# Patient Record
Sex: MALE | Race: OTHER | HISPANIC OR LATINO | Employment: FULL TIME | URBAN - METROPOLITAN AREA
[De-identification: names, ages, dates, MRNs, and addresses within clinical notes are randomized per-mention and may not be internally consistent; named-entity substitution may affect disease eponyms.]

---

## 2017-06-28 ENCOUNTER — OFFICE VISIT (OUTPATIENT)
Dept: FAMILY MEDICINE | Facility: CLINIC | Age: 22
End: 2017-06-28
Payer: COMMERCIAL

## 2017-06-28 VITALS
WEIGHT: 178.13 LBS | DIASTOLIC BLOOD PRESSURE: 71 MMHG | OXYGEN SATURATION: 97 % | HEIGHT: 72 IN | HEART RATE: 77 BPM | SYSTOLIC BLOOD PRESSURE: 127 MMHG | BODY MASS INDEX: 24.13 KG/M2

## 2017-06-28 DIAGNOSIS — Z87.09 HISTORY OF PNEUMOTHORAX: ICD-10-CM

## 2017-06-28 DIAGNOSIS — Z11.3 SCREENING EXAMINATION FOR STD (SEXUALLY TRANSMITTED DISEASE): ICD-10-CM

## 2017-06-28 DIAGNOSIS — Z72.0 TOBACCO ABUSE: ICD-10-CM

## 2017-06-28 DIAGNOSIS — Z00.00 ROUTINE GENERAL MEDICAL EXAMINATION AT A HEALTH CARE FACILITY: Primary | ICD-10-CM

## 2017-06-28 DIAGNOSIS — Z23 NEED FOR DIPHTHERIA-TETANUS-PERTUSSIS (TDAP) VACCINE: ICD-10-CM

## 2017-06-28 DIAGNOSIS — G47.00 INSOMNIA, UNSPECIFIED TYPE: ICD-10-CM

## 2017-06-28 DIAGNOSIS — J45.990 EXERCISE-INDUCED ASTHMA: ICD-10-CM

## 2017-06-28 DIAGNOSIS — N50.819 TESTICULAR PAIN, UNSPECIFIED: ICD-10-CM

## 2017-06-28 PROBLEM — J93.9 PNEUMOTHORAX, LEFT: Status: ACTIVE | Noted: 2017-06-28

## 2017-06-28 PROCEDURE — 99395 PREV VISIT EST AGE 18-39: CPT | Mod: 25,S$GLB,, | Performed by: FAMILY MEDICINE

## 2017-06-28 PROCEDURE — 90471 IMMUNIZATION ADMIN: CPT | Mod: S$GLB,,, | Performed by: FAMILY MEDICINE

## 2017-06-28 PROCEDURE — 99999 PR PBB SHADOW E&M-EST. PATIENT-LVL IV: CPT | Mod: PBBFAC,,, | Performed by: FAMILY MEDICINE

## 2017-06-28 PROCEDURE — 90715 TDAP VACCINE 7 YRS/> IM: CPT | Mod: S$GLB,,, | Performed by: FAMILY MEDICINE

## 2017-06-28 RX ORDER — ALBUTEROL SULFATE 90 UG/1
AEROSOL, METERED RESPIRATORY (INHALATION)
Qty: 1 EACH | Refills: 5 | Status: SHIPPED | OUTPATIENT
Start: 2017-06-28 | End: 2018-11-19 | Stop reason: SDUPTHER

## 2017-06-28 NOTE — PROGRESS NOTES
Office Visit    Patient Name: Salvatore Bobby    : 1995  MRN: [id number]    Subjective:  Salvatore GUO is a 22 y.o. male who presents today for:    Establish Care    Salvatore Bobby presents today for annual wellness exam.     They have been feeling overall well.  He has a history of exercise-induced asthma that has overall been very well controlled, only very rarely uses albuterol prior to exercise.  He does note some intermittent diarrhea and suspects that it is related to some food hypersensitivities, as he notices an association with certain milk-based products and certain spices.  He does not have blood in his stool, and he has normal bowel movements in between her episodes of diarrhea.  Finally, he does complain of some intermittently painful testicular elevation.  He notices that during sexual intercourse and during activities such as running his testicles seem to ride up in the inguinal canal and this is quite painful.  He does not have a history of hernia, hydrocele, or other testicular abnormality.  He has not noticed any abnormalities on self exam.  Her pain with activities such as coughing or having lifting.  His urinary habits are normal.    General lifestyle habits are as follows:  Diet is described as generally healthy-- no regular fast food or soft drinks, exercise is described as good-- plays volley ball and going to the gym, sleep is described as poor-- stays up very late-- gets about 6-7 hours nightly.   Rare alcohol on special occasions.  Quit smoking a few years ago. Weight is up and down some but overall stable in the last 2 years.    Immunizations: TDaP 2006-- due for booster and given, otherwise pediatric immunizations up to date    Screening Tests: STD screening and blood work today    Eye/Dental: has eye appointment pending and dental up to date            Past Medical History  Past Medical History:   Diagnosis Date    Chest pain, unspecified     Exercise-induced asthma      Pneumothorax, left     treated with chest tube 2012       Past Surgical History  Past Surgical History:   Procedure Laterality Date    pneumothorax         Family History  Family History   Problem Relation Age of Onset    Hyperlipidemia Father     Heart disease Paternal Grandmother     Hyperlipidemia Mother     Heart disease Maternal Grandmother     Hyperlipidemia Maternal Grandmother     Hypertension Maternal Grandmother     Diabetes Maternal Grandmother     Diabetes Paternal Grandfather        Social History  Social History     Social History    Marital status: Single     Spouse name: N/A    Number of children: N/A    Years of education: N/A     Occupational History     Student     Social History Main Topics    Smoking status: Former Smoker     Types: Cigarettes    Smokeless tobacco: Never Used      Comment: quit in 2012/2013 after pneumothorax    Alcohol use Yes      Comment: occasionally    Drug use: Unknown    Sexual activity: Yes     Partners: Female     Other Topics Concern    Not on file     Social History Narrative    Garcia--seniorPlays soccerDad smokes intermittentlyNo pets       Current Medications  Medications reviewed and updated.     Allergies   Review of patient's allergies indicates:  No Known Allergies    Review of Systems (Pertinent positives)  Review of Systems   Constitutional: Negative for unexpected weight change.   HENT: Positive for hearing loss (has some concern for decreased hearing).    Eyes: Negative for visual disturbance.   Respiratory: Negative for shortness of breath and wheezing.    Cardiovascular: Negative for chest pain.   Gastrointestinal: Positive for diarrhea. Negative for blood in stool.   Genitourinary: Positive for testicular pain. Negative for difficulty urinating, discharge and scrotal swelling.   Allergic/Immunologic: Food allergies: possible food sensitivities.   Neurological: Negative for dizziness.   Psychiatric/Behavioral: Positive for sleep  disturbance (mild).       /71   Pulse 77   Ht 6' (1.829 m)   Wt 80.8 kg (178 lb 2.1 oz)   SpO2 97%   BMI 24.16 kg/m²     Physical Exam      Assessment/Plan:  Salvatore Bobby is a 22 y.o. male who presents today for :    Salvatore GUO was seen today for establish care.    Diagnoses and all orders for this visit:    Routine general medical examination at a health care facility  -     Comprehensive metabolic panel; Future  -     Lipid panel; Future  -     CBC auto differential; Future  -     TSH; Future  -     Vitamin D; Future  -     HIV 1 / 2 ANTIBODY; Future  -     C. trachomatis/N. gonorrhoeae by AMP DNA Urine    Body mass index (BMI) 24.0-24.9, adult    Exercise-induced asthma  -     albuterol 90 mcg/actuation inhaler; Inhale 1-2 puffs 20-30 minutes before exercise as needed.    History of pneumothorax    Tobacco abuse  Comments:  quit in 2012/2013, no cigarettes in the last 2-3 years    Screening examination for STD (sexually transmitted disease)  -     HIV 1 / 2 ANTIBODY; Future  -     C. trachomatis/N. gonorrhoeae by AMP DNA Urine    Insomnia, unspecified type  Comments:  trial of regular exercise and regular sleep schedule, benadryl as needed    Testicular pain, unspecified  Comments:  some testicular elevation that is at times painful-?hypersensitive cremasteric reflex vs temperature regulation issues. unremarkable exam- will refer to Urology  Orders:  -     Ambulatory referral to Urology    Need for diphtheria-tetanus-pertussis (Tdap) vaccine  -     (In Office Administered) Tdap Vaccine            ICD-10-CM ICD-9-CM    1. Routine general medical examination at a health care facility Z00.00 V70.0 Comprehensive metabolic panel      Lipid panel      CBC auto differential      TSH      Vitamin D      HIV 1 / 2 ANTIBODY      C. trachomatis/N. gonorrhoeae by AMP DNA Urine   2. Body mass index (BMI) 24.0-24.9, adult Z68.24 V85.1    3. Exercise-induced asthma J45.990 493.81 albuterol 90 mcg/actuation inhaler    4. History of pneumothorax Z87.09 V12.69    5. Tobacco abuse Z72.0 305.1     quit in 2012/2013, no cigarettes in the last 2-3 years   6. Screening examination for STD (sexually transmitted disease) Z11.3 V74.5 HIV 1 / 2 ANTIBODY      C. trachomatis/N. gonorrhoeae by AMP DNA Urine   7. Insomnia, unspecified type G47.00 780.52     trial of regular exercise and regular sleep schedule, benadryl as needed   8. Testicular pain, unspecified N50.819 608.9 Ambulatory referral to Urology    some testicular elevation that is at times painful-?hypersensitive cremasteric reflex vs temperature regulation issues. unremarkable exam- will refer to Urology   9. Need for diphtheria-tetanus-pertussis (Tdap) vaccine Z23 V06.1 (In Office Administered) Tdap Vaccine       Return in about 1 year (around 6/28/2018) for return as needed for new concerns.

## 2017-07-14 ENCOUNTER — LAB VISIT (OUTPATIENT)
Dept: LAB | Facility: HOSPITAL | Age: 22
End: 2017-07-14
Attending: FAMILY MEDICINE
Payer: COMMERCIAL

## 2017-07-14 ENCOUNTER — OFFICE VISIT (OUTPATIENT)
Dept: UROLOGY | Facility: CLINIC | Age: 22
End: 2017-07-14
Payer: COMMERCIAL

## 2017-07-14 VITALS
WEIGHT: 178 LBS | HEART RATE: 67 BPM | SYSTOLIC BLOOD PRESSURE: 113 MMHG | BODY MASS INDEX: 24.11 KG/M2 | HEIGHT: 72 IN | DIASTOLIC BLOOD PRESSURE: 73 MMHG

## 2017-07-14 DIAGNOSIS — Z00.00 ROUTINE GENERAL MEDICAL EXAMINATION AT A HEALTH CARE FACILITY: ICD-10-CM

## 2017-07-14 DIAGNOSIS — N50.819 TESTICULAR PAIN, UNSPECIFIED: Primary | ICD-10-CM

## 2017-07-14 DIAGNOSIS — Z11.3 SCREENING EXAMINATION FOR STD (SEXUALLY TRANSMITTED DISEASE): ICD-10-CM

## 2017-07-14 LAB
25(OH)D3+25(OH)D2 SERPL-MCNC: 25 NG/ML
ALBUMIN SERPL BCP-MCNC: 4 G/DL
ALP SERPL-CCNC: 85 U/L
ALT SERPL W/O P-5'-P-CCNC: 13 U/L
ANION GAP SERPL CALC-SCNC: 9 MMOL/L
AST SERPL-CCNC: 32 U/L
BASOPHILS # BLD AUTO: 0.03 K/UL
BASOPHILS NFR BLD: 0.5 %
BILIRUB SERPL-MCNC: 1.8 MG/DL
BUN SERPL-MCNC: 17 MG/DL
CALCIUM SERPL-MCNC: 9 MG/DL
CHLORIDE SERPL-SCNC: 104 MMOL/L
CHOLEST/HDLC SERPL: 3.8 {RATIO}
CO2 SERPL-SCNC: 27 MMOL/L
CREAT SERPL-MCNC: 0.9 MG/DL
DIFFERENTIAL METHOD: ABNORMAL
EOSINOPHIL # BLD AUTO: 0.1 K/UL
EOSINOPHIL NFR BLD: 1.9 %
ERYTHROCYTE [DISTWIDTH] IN BLOOD BY AUTOMATED COUNT: 12.6 %
EST. GFR  (AFRICAN AMERICAN): >60 ML/MIN/1.73 M^2
EST. GFR  (NON AFRICAN AMERICAN): >60 ML/MIN/1.73 M^2
GLUCOSE SERPL-MCNC: 92 MG/DL
HCT VFR BLD AUTO: 44.8 %
HDL/CHOLESTEROL RATIO: 26.1 %
HDLC SERPL-MCNC: 157 MG/DL
HDLC SERPL-MCNC: 41 MG/DL
HGB BLD-MCNC: 14.6 G/DL
HIV 1+2 AB+HIV1 P24 AG SERPL QL IA: NEGATIVE
LDLC SERPL CALC-MCNC: 99 MG/DL
LYMPHOCYTES # BLD AUTO: 3.1 K/UL
LYMPHOCYTES NFR BLD: 48.5 %
MCH RBC QN AUTO: 28 PG
MCHC RBC AUTO-ENTMCNC: 32.6 %
MCV RBC AUTO: 86 FL
MONOCYTES # BLD AUTO: 0.4 K/UL
MONOCYTES NFR BLD: 7 %
NEUTROPHILS # BLD AUTO: 2.7 K/UL
NEUTROPHILS NFR BLD: 42.1 %
NONHDLC SERPL-MCNC: 116 MG/DL
PLATELET # BLD AUTO: 221 K/UL
PMV BLD AUTO: 9.9 FL
POTASSIUM SERPL-SCNC: 3.6 MMOL/L
PROT SERPL-MCNC: 7.1 G/DL
RBC # BLD AUTO: 5.21 M/UL
SODIUM SERPL-SCNC: 140 MMOL/L
TRIGL SERPL-MCNC: 85 MG/DL
TSH SERPL DL<=0.005 MIU/L-ACNC: 1.91 UIU/ML
WBC # BLD AUTO: 6.33 K/UL

## 2017-07-14 PROCEDURE — 80061 LIPID PANEL: CPT

## 2017-07-14 PROCEDURE — 82306 VITAMIN D 25 HYDROXY: CPT

## 2017-07-14 PROCEDURE — 99999 PR PBB SHADOW E&M-EST. PATIENT-LVL III: CPT | Mod: PBBFAC,,, | Performed by: UROLOGY

## 2017-07-14 PROCEDURE — 80053 COMPREHEN METABOLIC PANEL: CPT

## 2017-07-14 PROCEDURE — 85025 COMPLETE CBC W/AUTO DIFF WBC: CPT

## 2017-07-14 PROCEDURE — 99203 OFFICE O/P NEW LOW 30 MIN: CPT | Mod: S$GLB,,, | Performed by: UROLOGY

## 2017-07-14 PROCEDURE — 84443 ASSAY THYROID STIM HORMONE: CPT

## 2017-07-14 PROCEDURE — 36415 COLL VENOUS BLD VENIPUNCTURE: CPT

## 2017-07-14 PROCEDURE — 86703 HIV-1/HIV-2 1 RESULT ANTBDY: CPT

## 2017-07-14 NOTE — LETTER
July 14, 2017      Macarena Alberto MD  [address]  [address]  [facility] [address]           [facility]  [address]  [facility] [address]  Phone: [phone]          Patient: Salvatore Bobby   MR Number: [id number]   YOB: 1995   Date of Visit: 7/14/2017       Dear Dr. Macarena Alberto:    Thank you for referring Salvatore Bobby to me for evaluation. Attached you will find relevant portions of my assessment and plan of care.    If you have questions, please do not hesitate to call me. I look forward to following Salvatore Bobby along with you.    Sincerely,    Neftali Brown MD    Enclosure  CC:  No Recipients    If you would like to receive this communication electronically, please contact [email or web address] or [phone] to request more information on [facility] Link access.    For providers and/or their staff who would like to refer a patient to [facility], please contact us through our one-stop-shop provider referral line, [facility], at [phone].    If you feel you have received this communication in error or would no longer like to receive these types of communications, please e-mail [email or web address]

## 2017-07-14 NOTE — PROGRESS NOTES
"HPI:  Salvatore Bobby is a 22 y.o. year old male that  presents with   Chief Complaint   Patient presents with    Testicle Pain     pt states discomfort when active   .  This patient referred by his PCP for testicular pain.  He states this is been going on for 6 years.  He states this is bilateral.  He states that it is worse when he is active.  He states that both testicles recede back into my body".    He gives no history of injury infection or surgery or trauma to his testicles.  He has no dysuria    Is no family history of prostate cancer and the patient is never had any urological surgery    Chart review shows no from his PCP from last month showing asthma and testicular pain prompting urology referral.  In 2014 GFR was greater than 60      Past Medical History:   Diagnosis Date    Chest pain, unspecified     Exercise-induced asthma     Pneumothorax, left     treated with chest tube 2012     Social History     Social History    Marital status: Single     Spouse name: N/A    Number of children: N/A    Years of education: N/A     Occupational History     Student     Social History Main Topics    Smoking status: Former Smoker     Types: Cigarettes    Smokeless tobacco: Never Used      Comment: quit in 2012/2013 after pneumothorax    Alcohol use Yes      Comment: occasionally    Drug use: Unknown    Sexual activity: Yes     Partners: Female     Other Topics Concern    Not on file     Social History Narrative    Garcia--seniorPlays soccerDad smokes intermittentlyNo pets     Past Surgical History:   Procedure Laterality Date    pneumothorax       Family History   Problem Relation Age of Onset    Hyperlipidemia Father     Heart disease Paternal Grandmother     Hyperlipidemia Mother     Heart disease Maternal Grandmother     Hyperlipidemia Maternal Grandmother     Hypertension Maternal Grandmother     Diabetes Maternal Grandmother     Diabetes Paternal Grandfather            Review of " Systems  The patient has no chest pains.  The patient has no shortness of breath  Patient wears        glasses.  All other review of systems are negative.      Physical Exam:  /73   Pulse 67   Ht 6' (1.829 m)   Wt 80.7 kg (178 lb)   BMI 24.14 kg/m²   General appearance: alert, cooperative, no distress  Constitutional:Oriented to person, place, and time.appears well-developed and well-nourished.   HEENT: Normocephalic, atraumatic, neck symmetrical, no nasal discharge   Eyes: conjunctivae/corneas clear, PERRL, EOM's intact  Lungs: clear to auscultation bilaterally, no dullness to percussion bilaterally  Heart: regular rate and rhythm without rub; no displacement of the PMI   Abdomen: soft, non-tender; bowel sounds normoactive; no organomegaly  :Penis/perineum without lesions, scrotum without rash/cysts, epididymis nontender bilaterally, urethral meatus in normal location normal size, no penile plaques palpated testes equal in size without masses.  Extremities: extremities symmetric; no clubbing, cyanosis, or edema  Integument: Skin color, texture, turgor normal; no rashes; hair distrubution normal  Neurologic: Alert and oriented X 3, normal strength, normal coordination and gait  Psychiatric: no pressured speech; normal affect; no evidence of impaired cognition     LABS:    Complete Blood Count  Lab Results   Component Value Date    RBC 5.21 07/14/2017    HGB 14.6 07/14/2017    HCT 44.8 07/14/2017    MCV 86 07/14/2017    MCH 28.0 07/14/2017    MCHC 32.6 07/14/2017    RDW 12.6 07/14/2017     07/14/2017    MPV 9.9 07/14/2017    GRAN 2.7 07/14/2017    GRAN 42.1 07/14/2017    LYMPH 3.1 07/14/2017    LYMPH 48.5 (H) 07/14/2017    MONO 0.4 07/14/2017    MONO 7.0 07/14/2017    EOS 0.1 07/14/2017    BASO 0.03 07/14/2017    EOSINOPHIL 1.9 07/14/2017    BASOPHIL 0.5 07/14/2017    DIFFMETHOD Automated 07/14/2017       Comprehensive Metabolic Panel  Lab Results   Component Value Date    GLU 92 07/14/2017    BUN  17 07/14/2017    CREATININE 0.9 07/14/2017     07/14/2017    K 3.6 07/14/2017     07/14/2017    PROT 7.1 07/14/2017    ALBUMIN 4.0 07/14/2017    BILITOT 1.8 (H) 07/14/2017    AST 32 07/14/2017    ALKPHOS 85 07/14/2017    CO2 27 07/14/2017    ALT 13 07/14/2017    ANIONGAP 9 07/14/2017    EGFRNONAA >60 07/14/2017    ESTGFRAFRICA >60 07/14/2017       PSA  No results found for: PSA      Assessment:    ICD-10-CM ICD-9-CM    1. Testicular pain, unspecified N50.819 608.9      The encounter diagnosis was Testicular pain, unspecified.      Plan: #1.  Testicular pain.  Plan.  I discussed that I cannot explain the cause of his pain.  This seems to be related to a very active cremasteric reflex for which there is no specific therapy other than bilateral orchiopexy which I do not recommend, since at rest his testicles are in the normal position.  I reassured the patient that his exam is normal.  At this point follow-up on an as-needed basis  No orders of the defined types were placed in this encounter.          Neftali Brown MD    PLEASE NOTE:  Please be advised that portions of this note were dictated using voice recognition software and may contain dictation related errors in spelling/grammar/appropriate pronouns/syntax or other errors that might have not been found and or corrected on text review.

## 2017-07-15 PROBLEM — E55.9 VITAMIN D DEFICIENCY: Status: ACTIVE | Noted: 2017-07-15

## 2017-07-15 PROBLEM — Z72.0 TOBACCO ABUSE: Status: RESOLVED | Noted: 2017-06-28 | Resolved: 2017-07-15

## 2017-07-17 ENCOUNTER — TELEPHONE (OUTPATIENT)
Dept: FAMILY MEDICINE | Facility: CLINIC | Age: 22
End: 2017-07-17

## 2018-02-23 ENCOUNTER — OFFICE VISIT (OUTPATIENT)
Dept: FAMILY MEDICINE | Facility: CLINIC | Age: 23
End: 2018-02-23
Payer: COMMERCIAL

## 2018-02-23 VITALS
SYSTOLIC BLOOD PRESSURE: 113 MMHG | TEMPERATURE: 98 F | HEIGHT: 72 IN | WEIGHT: 189.63 LBS | BODY MASS INDEX: 25.68 KG/M2 | OXYGEN SATURATION: 99 % | HEART RATE: 60 BPM | DIASTOLIC BLOOD PRESSURE: 66 MMHG

## 2018-02-23 DIAGNOSIS — S06.0X0A CONCUSSION WITHOUT LOSS OF CONSCIOUSNESS, INITIAL ENCOUNTER: Primary | ICD-10-CM

## 2018-02-23 DIAGNOSIS — M25.571 ACUTE RIGHT ANKLE PAIN: ICD-10-CM

## 2018-02-23 PROCEDURE — 3008F BODY MASS INDEX DOCD: CPT | Mod: S$GLB,,, | Performed by: FAMILY MEDICINE

## 2018-02-23 PROCEDURE — 99214 OFFICE O/P EST MOD 30 MIN: CPT | Mod: S$GLB,,, | Performed by: FAMILY MEDICINE

## 2018-02-23 PROCEDURE — 99999 PR PBB SHADOW E&M-EST. PATIENT-LVL III: CPT | Mod: PBBFAC,,, | Performed by: FAMILY MEDICINE
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
## 2018-02-23 NOTE — PATIENT INSTRUCTIONS
"  Concussion    A concussion can be caused by a direct blow to the head, neck, face, or somewhere else on the body with the force being transmitted to the head. This may cause you to lose consciousness - be "knocked out" - but not always. Depending on the severity of the blow, it will take from a few hours up to a few days to get better. Sometimes symptoms may last a few months or longer. This is called post-concussion syndrome.  At first, you may have a headache, nausea, vomiting, or dizziness. You may also have problems concentrating or remembering things. This is normal.  Symptoms should get better as the hours and days go by. Symptoms that get worse could be a sign of a more serious injury. This might be a bruise or bleeding in the brain. Thats why its important to watch for the warning signs listed below.  Home care  If your injury is mild and there are no serious signs or symptoms, your healthcare provider may recommend that you be monitored at home. If there is evidence that the injury is more serious, you will be monitored in the hospital. Follow these tips to help care for yourself at home:  · After a concussion, your healthcare provider may recommend that a family member or friend monitor you for 12 to 24 hours. They may be told to wake you every few hours during sleep to check for the signs below.  · If your face or scalp swells, apply an ice pack for 20 minutes every 1 to 2 hours. Do this until the swelling starts to go down. You can make an ice pack by putting ice cubes in a plastic bag and wrapping the bag in a towel.  · You may use acetaminophen to control pain, unless another pain medicine was prescribed. Do not use aspirin or ibuprofen after a head injury. If you have chronic liver or kidney disease, talk with your doctor before using these medicines. Also talk with your doctor if you ever had a stomach ulcer or gastrointestinal bleeding.  · For the next 24 hours:  ¨ Dont drink alcohol or take " sedatives or medicines that make you sleepy.  ¨ Dont drive or operate machinery.  ¨ Avoid doing anything strenuous. Dont lift or strain.  · Dont return to sports or any activity that could cause you to hit your head until all symptoms are gone and you have been cleared by your doctor. A second head injury before fully recovering from the first one can lead to serious brain injury.  · Avoid doing activities that require a lot of concentration or a lot of attention. This will allow your brain to rest and heal quicker.  Follow-up care  Follow up with your doctor in 1 week, or as directed.  Note: A radiologist will review any X-rays or CT scans that were taken. You will be told of any new findings that may affect your care.  When to seek medical advice  Call your healthcare provider right away if any of these occur:  · Repeated vomiting  · Headache or dizziness that is severe or gets worse  · Loss of consciousness  · Unusual drowsiness, or unable to wake up as usual  · Weakness or decreased ability to walk or move any limb  · Confusion, agitation, or change in behavior or speech, or memory loss  · Blurred vision  · Convulsion (seizure)  · Swelling on the scalp or face that gets worse  · Changes in pupil size (the black part of the eye)  · Redness, warmth, or pus from the swollen area  · Fluid draining from or bleeding from the nose or ears     Date Last Reviewed: 8/14/2015  © 2914-1953 [employer]. [address] [place] [address]. All rights reserved. This information is not intended as a substitute for professional medical care. Always follow your healthcare professional's instructions.        Foot Contusion  You have a contusion. This is also called a bruise. There is swelling and some bleeding under the skin, but no broken bones. This injury generally takes a few days to a few weeks to heal.  During that time, the bruise will typically change in color from reddish, to purple-blue, to  greenish-yellow, then to yellow-brown.  Home care  · Elevate the foot to reduce pain and swelling. As much as possible, sit or lie down with the foot raised about the level of your heart. This is especially important during the first 48 hours.  · Ice the foot to help reduce pain and swelling. Wrap a cold source (ice pack or ice cubes in a plastic bag) in a thin towel. Apply to the bruised area for 20 minutes every 1 to 2 hours the first day. Continue this 3 to 4 times a day until the pain and swelling goes away.  · Unless another medicine was prescribed, you can take acetaminophen, ibuprofen, or naproxen to control pain. (If you have chronic liver or kidney disease or ever had a stomach ulcer or gastrointestinal bleeding, talk with your healthcare provider before using these medicines.)  Follow up  Follow up with your healthcare provider or our staff as advised. Call if you are not improving within 1 to 2 weeks.  When to seek medical advice   Call your healthcare provider right away if you have any of the following:  · Increased pain or swelling  · Foot or leg becomes cold, blue, numb or tingly  · Signs of infection: Warmth, drainage, or increased redness or pain around the bruise  · Inability to move the injured foot   · Frequent bruising for unknown reasons  Date Last Reviewed: 2/1/2017 © 2000-2017 [employer]. [address] [place] [address], [place] [address]. All rights reserved. This information is not intended as a substitute for professional medical care. Always follow your healthcare professional's instructions.      Ankle Sprain Rehabilitation Exercises  As soon as you can tolerate pressure on the ball of your foot, begin stretching your ankle using the towel stretch. When this stretch is too easy, try the standing calf stretch and soleus stretch.   Towel stretch: Sit on a hard surface with one leg stretched out in front of you. Loop a towel around your toes and the ball of your foot and pull the  towel toward your body keeping your knee straight. Hold this position for 15 to 30 seconds then relax. Repeat 3 times.   Standing calf stretch: Facing a wall, put your hands against the wall at about eye level. Keep one leg back with the heel on the floor, and the other leg forward. Turn your back foot slightly inward (as if you were pigeon-toed) as you slowly lean into the wall until you feel a stretch in the back of your calf. Hold for 15 to 30 seconds. Repeat 3 times and then switch the position of your legs and repeat the exercise 3 times. Do this exercise several times each day.   Standing soleus stretch: Stand facing a wall with your hands on a wall at about chest level. With both knees slightly bent and one foot back, gently lean into the wall until you feel a stretch in your lower calf. Angle the toes of your back foot slightly inward and keep your heel down on the floor. Hold this for 15 to 30 seconds. Return to the starting position. Repeat 3 times.   You can do the next 5 exercises when your ankle swelling has stopped increasing.   Ankle range of motion: Sitting or lying down with your legs straight and your knee toward the ceiling, move your ankle up and down by pointing your toes toward your nose, then away from your body; in toward your other foot and out away from your other foot; and in circles. Only move your foot and ankle. Don't move your leg. Repeat 10 times in each direction. Push hard in all directions.   Resisted ankle dorsiflexion: Sit with one leg out straight and your foot facing a doorway. Tie a loop in one end of elastic tubing. Put your foot through the loop so that the tubing goes around the arch of your foot. Tie a knot in the other end of the tubing and shut the knot in the door. Move backward until there is tension in the tubing. Keeping your knee straight, pull your foot toward your body, stretching the tubing. Slowly return to the starting position. Do 3 sets of 10.   Resisted  ankle plantar flexion: Sit with your leg outstretched and loop the middle section of the tubing around the ball of your foot. Hold the ends of the tubing in both hands. Gently press the ball of your foot down and point your toes, stretching the tubing. Return to the starting position. Do 3 sets of 10.   Resisted ankle inversion: Sit with your legs out straight and cross one leg over your other ankle. Wrap elastic tubing around the ball of your bottom foot and then loop it around your top foot so that the tubing is anchored there at one end. Hold the other end of the tubing in your hand. Turn your bottom foot inward and upward. This will stretch the tubing. Return to the starting position. Do 3 sets of 10   Resisted ankle eversion: Sit with both legs stretched out in front of you, with your feet about a shoulder's width apart. Tie a loop in one end of elastic tubing. Put one foot through the loop so that the tubing goes around the arch of that foot and wraps around the outside of the other foot. Hold onto the other end of the tubing with your hand to provide tension. Turn the foot with the tubing up and out. Make sure you keep your other foot still so that it will allow the tubing to stretch as you move your foot with the tubing. Return to the starting position. Do 3 sets of 10.   You may do the rest of the exercises when you can stand on your injured ankle without pain.   Heel raise: Balance yourself while standing behind a chair or counter. Using the chair to help you, raise your body up onto your toes and hold for 5 seconds. Then slowly lower yourself down without holding onto the chair. Hold onto the chair or counter if you need to. When this exercise becomes less painful, try lowering on one leg only. Repeat 10 times. Do 3 sets of 10.   Step-up: Stand with the foot of your injured leg on a support (like a small step or block of wood) 3 to 5 inches high. Keep your other foot flat on the floor. Shift your weight  onto your injured leg on the support straighten your knee as the other leg comes off the floor. Lower your leg back to the floor slowly. Do 3 sets of 10.   Balance and reach exercises   Stand upright next to a chair with your injured leg farthest from the chair. This will provide you with support if you need it. Stand just on the foot of your injured leg. Try to raise the arch of this foot while keeping your toes on the floor.   Keep your foot in this position and reach forward in front of you with the hand farthest away from the chair, allowing your knee to bend. Repeat this 10 times while maintaining the arch height. This exercise can be made more difficult by reaching farther in front of you. Do 2 sets.    the same position as above. While maintaining your arch height, reach the hand farthest away from the chair across your body toward the chair. The farther you reach, the more challenging the exercise. Do 2 sets of 10.   Jump rope: Jump rope landing, on both legs, for 5 minutes, then on only one leg at a time for 5 minutes.   Wobble board exercises:   Stand on a wobble board with your feet shoulder width apart. Rock the board forwards and backwards 30 times, then side to side 30 times. Hold on to a chair if you need support.   Rotate the wobble board around so that the edge of the board is in contact with the floor at all times. Do this 30 times in a clockwise and then a counterclockwise direction.   Balance on the wobble board for as long as you can without letting the edges touch the floor. Try to do this for 2 minutes without touching the floor.   Rotate the wobble board in clockwise and counterclockwise circles, but do not allow the edge of the board to touch the floor.   When you have mastered exercises A through D, try repeating them while standing on only one leg (your injured leg).   Once you can do these exercises on one leg, try to do them with your eyes closed. Make sure you have something  nearby to support you in case you lose your balance.   Written by Shira Madrigal, MS, PT, and Aniyah Bass PT, [facility], [facility], for [facility].   Published by [email or web address].  © 2009 [email or web address] and/or its affiliates. All Rights Reserved.

## 2018-03-02 ENCOUNTER — OFFICE VISIT (OUTPATIENT)
Dept: FAMILY MEDICINE | Facility: CLINIC | Age: 23
End: 2018-03-02
Payer: COMMERCIAL

## 2018-03-02 VITALS
DIASTOLIC BLOOD PRESSURE: 63 MMHG | BODY MASS INDEX: 25.74 KG/M2 | WEIGHT: 190.06 LBS | HEIGHT: 72 IN | TEMPERATURE: 98 F | OXYGEN SATURATION: 98 % | SYSTOLIC BLOOD PRESSURE: 112 MMHG | HEART RATE: 67 BPM

## 2018-03-02 DIAGNOSIS — S06.0X0A CONCUSSION WITHOUT LOSS OF CONSCIOUSNESS, INITIAL ENCOUNTER: Primary | ICD-10-CM

## 2018-03-02 PROCEDURE — 99213 OFFICE O/P EST LOW 20 MIN: CPT | Mod: S$GLB,,, | Performed by: FAMILY MEDICINE

## 2018-03-02 PROCEDURE — 99999 PR PBB SHADOW E&M-EST. PATIENT-LVL III: CPT | Mod: PBBFAC,,, | Performed by: FAMILY MEDICINE

## 2018-03-02 NOTE — PROGRESS NOTES
(Portions of this note were dictated using voice recognition software and may contain dictation related errors in spelling/grammar/syntax not found on text review)    CC:   Chief Complaint   Patient presents with    Follow-up       HPI: 23 y.o. male Here for medical follow-up.  Was seen on 2/23/18.  Was playing soccer and trip in another player fell over him and that player's knee hit the patient's head while he was on the ground.  No loss of consciousness but felt unsteady for about 20 seconds.  At time of last visit he was reporting some post concussive symptoms such as feeling somewhat emotional, head throbbing symptoms, no vomiting or nausea or visual changes.  His neurologic exam is overall normal in the office.  We discussed that he did not need to perform any cranial imaging at the time but I would advise follow-up today to assess his overall symptom control as I would not recommend returning to play or any significant heavy activity until his symptoms had completely resolved.  He is here for that follow-up today    He states that the day or 2 after he will visit with me, he relaxed and stayed home.  He did try to play some video games but then got a headache so he stopped.  On Monday of this week 4 days ago he went to work and was out in the heat and felt headache and dizziness so he stopped after about half a day.  He rested and felt that her.  On Tuesday 3 days ago he tried to do a light workout but then got a headache so he did not complete.  However, 2 days ago he did a full workout and did not have any significant problems.  He would take ibuprofen just occasionally for very transient headache.  No dizziness.  No nausea or vomiting.  No more emotional symptoms.  Again describes occasional throbbing sensation in the head but this is very mild.      He is anxious to be cleared at some time because he has a play off soccer game coming up next Thursday.  He does mention that this is intramural and is not  extremely intense or competitive typically.  He does not have formal practices     Past Medical History:   Diagnosis Date    Chest pain, unspecified     Exercise-induced asthma     Pneumothorax, left     treated with chest tube 2012       Past Surgical History:   Procedure Laterality Date    pneumothorax         Family History   Problem Relation Age of Onset    Hyperlipidemia Father     Heart disease Paternal Grandmother     Hyperlipidemia Mother     Heart disease Maternal Grandmother     Hyperlipidemia Maternal Grandmother     Hypertension Maternal Grandmother     Diabetes Maternal Grandmother     Diabetes Paternal Grandfather        Social History     Social History    Marital status: Single     Spouse name: N/A    Number of children: N/A    Years of education: N/A     Occupational History     Student     Social History Main Topics    Smoking status: Former Smoker     Types: Cigarettes    Smokeless tobacco: Never Used      Comment: quit in 2012/2013 after pneumothorax    Alcohol use Yes      Comment: occasionally    Drug use: Unknown    Sexual activity: Yes     Partners: Female     Other Topics Concern    Not on file     Social History Narrative    Radha--seniorPlays soccerDad smokes intermittentlyNo pets       ROS:  GENERAL: No fever, chills, fatigability or weight loss.  SKIN:Above   HEAD: No headaches.  EYES: No visual changes  EARS: No ear pain or changes in hearing.  NOSE: No congestion or rhinorrhea.  MOUTH & THROAT: No hoarseness, change in voice, or sore throat.  NODES: Denies swollen glands.  CHEST: Denies STONE, cyanosis, wheezing, cough and sputum production.  CARDIOVASCULAR: Denies chest pain, PND, orthopnea.  ABDOMEN: No nausea, vomiting, or changes in bowel function.  URINARY: No flank pain, dysuria or hematuria.  PERIPHERAL VASCULAR: No claudication or cyanosis.  MUSCULOSKELETAL: No joint stiffness or swelling. Denies back pain.  NEUROLOGIC:  above     Vital signs reviewed  PE:    APPEARANCE: Well nourished, well developed, in no acute distress.    HEAD: Normocephalic, atraumatic.  EYES: PERRL. EOMI.   Conjunctivae noninjected.  EARS: TM's intact. Light reflex normal. No retraction or perforation  NOSE: Mucosa pink. Airway clear.  MOUTH & THROAT: No tonsillar enlargement. No pharyngeal erythema or exudate.   NECK: Supple with no cervical lymphadenopathy.    CHEST: Good inspiratory effort. Lungs clear to auscultation with no wheezes or crackles.  CARDIOVASCULAR: Normal S1, S2. No rubs, murmurs, or gallops.  ABDOMEN: Bowel sounds normal. Not distended. Soft. No tenderness or masses. No organomegaly.  EXTREMITIES: No edema, cyanosis, or clubbing.      IMPRESSION  1. Concussion without loss of consciousness, initial encounter            PLAN  Was still getting some symptoms as noted above more so the beginning of the week.  He was more recently able to participation in a full workout without significant problems.  He is anxious to be cleared for his upcoming soccer playoff game next Thursday.  I have recommended at this time I would not clear him for full game but he needs to undergo some graduated increase in activities if he can tolerate.  Now that he was able to work out fully without significant symptoms, I would have him over the weekend try some more intense drills to see if he can tolerate this.  He needs to give 24 hours at least between each graduated activity.  If he does well on some soccer specific drills over the weekend, he can participation with more of a practice game environment at the beginning of next week.  I will see him on Wednesday afternoon for recheck.  By that time if he has been able to participate in all the above without significant symptoms, he could be cleared to return to play

## 2018-03-07 ENCOUNTER — OFFICE VISIT (OUTPATIENT)
Dept: FAMILY MEDICINE | Facility: CLINIC | Age: 23
End: 2018-03-07
Payer: COMMERCIAL

## 2018-03-07 VITALS
SYSTOLIC BLOOD PRESSURE: 123 MMHG | BODY MASS INDEX: 25.8 KG/M2 | OXYGEN SATURATION: 97 % | HEIGHT: 72 IN | TEMPERATURE: 99 F | WEIGHT: 190.5 LBS | DIASTOLIC BLOOD PRESSURE: 64 MMHG | HEART RATE: 76 BPM

## 2018-03-07 DIAGNOSIS — S06.0X0D CONCUSSION WITHOUT LOSS OF CONSCIOUSNESS, SUBSEQUENT ENCOUNTER: Primary | ICD-10-CM

## 2018-03-07 PROCEDURE — 99999 PR PBB SHADOW E&M-EST. PATIENT-LVL III: CPT | Mod: PBBFAC,,, | Performed by: FAMILY MEDICINE

## 2018-03-07 PROCEDURE — 99213 OFFICE O/P EST LOW 20 MIN: CPT | Mod: S$GLB,,, | Performed by: FAMILY MEDICINE

## 2018-03-07 NOTE — PROGRESS NOTES
(Portions of this note were dictated using voice recognition software and may contain dictation related errors in spelling/grammar/syntax not found on text review)    CC:   Chief Complaint   Patient presents with    Clearance for Concussion       HPI: 23 y.o. male Last seen March 2 for concussion follow-up.  See last note for full details.  Was playing to play in an intramural plate of soccer game tomorrow.  Overall postconcussive symptoms had improved but not completely resolved with trial of more aggressive physical activity.  We discussed gradually increasing activity such as light work out, full workout, sport specific drills, full practice without symptomology to show that he is cleared to return to full game without concern.  He is here to discuss    5 days ago he played volleyball for 2-3 hours with friends and had no difficulty with headaches or any other neurological symptoms.  4 days ago he played soccer with friends and was running full speed  also kicking the ball with no difficulties.  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  Denies any further emotionality concerns.  Denies any headaches, nausea, vomiting, photophobia, phonophobia, coordination problems, imbalance, dizziness    Past Medical History:   Diagnosis Date    Chest pain, unspecified     Exercise-induced asthma     Pneumothorax, left     treated with chest tube 2012       Past Surgical History:   Procedure Laterality Date    pneumothorax         Family History   Problem Relation Age of Onset    Hyperlipidemia Father     Heart disease Paternal Grandmother     Hyperlipidemia Mother     Heart disease Maternal Grandmother     Hyperlipidemia Maternal Grandmother     Hypertension Maternal Grandmother     Diabetes Maternal Grandmother     Diabetes Paternal Grandfather        Social History     Social History    Marital status: Single      Spouse name: N/A    Number of children: N/A    Years of education: N/A     Occupational History     Student     Social History Main Topics    Smoking status: Former Smoker     Types: Cigarettes    Smokeless tobacco: Never Used      Comment: quit in 2012/2013 after pneumothorax    Alcohol use Yes      Comment: occasionally    Drug use: Unknown    Sexual activity: Yes     Partners: Female     Other Topics Concern    Not on file     Social History Narrative    Plays soccerDad smokes intermittentlyNo pets       ROS:  GENERAL: No fever, chills, fatigability or weight loss.  SKIN: No rashes, no itching.  HEAD: No headaches.  EYES: No visual changes  EARS: No ear pain or changes in hearing.  NOSE: No congestion or rhinorrhea.  MOUTH & THROAT: No hoarseness, change in voice, or sore throat.  NODES: Denies swollen glands.  CHEST: Denies STONE, cyanosis, wheezing, cough and sputum production.  CARDIOVASCULAR: Denies chest pain, PND, orthopnea.  ABDOMEN: No nausea, vomiting, or changes in bowel function.  URINARY: No flank pain, dysuria or hematuria.  PERIPHERAL VASCULAR: No claudication or cyanosis.  MUSCULOSKELETAL: No joint stiffness or swelling. Denies back pain.  NEUROLOGIC: above.    Vital signs reviewed  PE:   APPEARANCE: Well nourished, well developed, in no acute distress.    HEAD: Normocephalic, atraumatic.  EYES:     Conjunctivae noninjected.   EXTREMITIES: No edema, cyanosis, or clubbing.  NEURO: Cranial nerves intact  Normal gait, station  Finger to nose  negative Romberg  Motor function: 5/5 power all major muscle groups of both upper and lower extremities, symmetric  PSYCHIATRIC: Normal mood, affect    IMPRESSION  1. Concussion without loss of consciousness, subsequent encounter            PLAN  Neurologic exam normal.  Symptoms have cleared with more intense sporting activity.  He is medically cleared now to participate in his soccer intramural playoff game. Note to return to play provided.

## 2018-11-19 ENCOUNTER — OFFICE VISIT (OUTPATIENT)
Dept: FAMILY MEDICINE | Facility: CLINIC | Age: 23
End: 2018-11-19
Payer: COMMERCIAL

## 2018-11-19 ENCOUNTER — LAB VISIT (OUTPATIENT)
Dept: LAB | Facility: HOSPITAL | Age: 23
End: 2018-11-19
Attending: FAMILY MEDICINE
Payer: COMMERCIAL

## 2018-11-19 VITALS
HEIGHT: 72 IN | OXYGEN SATURATION: 97 % | DIASTOLIC BLOOD PRESSURE: 65 MMHG | WEIGHT: 180.75 LBS | TEMPERATURE: 98 F | SYSTOLIC BLOOD PRESSURE: 108 MMHG | BODY MASS INDEX: 24.48 KG/M2 | HEART RATE: 79 BPM

## 2018-11-19 DIAGNOSIS — Z11.3 SCREENING EXAMINATION FOR STD (SEXUALLY TRANSMITTED DISEASE): ICD-10-CM

## 2018-11-19 DIAGNOSIS — G47.00 INSOMNIA, UNSPECIFIED TYPE: ICD-10-CM

## 2018-11-19 DIAGNOSIS — J06.9 VIRAL UPPER RESPIRATORY INFECTION: ICD-10-CM

## 2018-11-19 DIAGNOSIS — Z87.891 FORMER SMOKER: ICD-10-CM

## 2018-11-19 DIAGNOSIS — E55.9 VITAMIN D DEFICIENCY: ICD-10-CM

## 2018-11-19 DIAGNOSIS — Z00.00 ROUTINE GENERAL MEDICAL EXAMINATION AT A HEALTH CARE FACILITY: Primary | ICD-10-CM

## 2018-11-19 DIAGNOSIS — Z23 NEED FOR INFLUENZA VACCINATION: ICD-10-CM

## 2018-11-19 DIAGNOSIS — J45.990 EXERCISE-INDUCED ASTHMA: ICD-10-CM

## 2018-11-19 DIAGNOSIS — Z87.09 HISTORY OF PNEUMOTHORAX: ICD-10-CM

## 2018-11-19 DIAGNOSIS — Z00.00 ROUTINE GENERAL MEDICAL EXAMINATION AT A HEALTH CARE FACILITY: ICD-10-CM

## 2018-11-19 LAB
25(OH)D3+25(OH)D2 SERPL-MCNC: 30 NG/ML
ALBUMIN SERPL BCP-MCNC: 4.1 G/DL
ALP SERPL-CCNC: 68 U/L
ALT SERPL W/O P-5'-P-CCNC: 10 U/L
ANION GAP SERPL CALC-SCNC: 7 MMOL/L
AST SERPL-CCNC: 15 U/L
BASOPHILS # BLD AUTO: 0.01 K/UL
BASOPHILS NFR BLD: 0.3 %
BILIRUB SERPL-MCNC: 1.5 MG/DL
BUN SERPL-MCNC: 14 MG/DL
CALCIUM SERPL-MCNC: 9.8 MG/DL
CHLORIDE SERPL-SCNC: 102 MMOL/L
CHOLEST SERPL-MCNC: 148 MG/DL
CHOLEST/HDLC SERPL: 3.4 {RATIO}
CO2 SERPL-SCNC: 30 MMOL/L
CREAT SERPL-MCNC: 0.9 MG/DL
DIFFERENTIAL METHOD: ABNORMAL
EOSINOPHIL # BLD AUTO: 0.1 K/UL
EOSINOPHIL NFR BLD: 2.5 %
ERYTHROCYTE [DISTWIDTH] IN BLOOD BY AUTOMATED COUNT: 12.5 %
EST. GFR  (AFRICAN AMERICAN): >60 ML/MIN/1.73 M^2
EST. GFR  (NON AFRICAN AMERICAN): >60 ML/MIN/1.73 M^2
ESTIMATED AVG GLUCOSE: 103 MG/DL
GLUCOSE SERPL-MCNC: 89 MG/DL
HBA1C MFR BLD HPLC: 5.2 %
HCT VFR BLD AUTO: 45.5 %
HDLC SERPL-MCNC: 43 MG/DL
HDLC SERPL: 29.1 %
HGB BLD-MCNC: 14.7 G/DL
LDLC SERPL CALC-MCNC: 90.2 MG/DL
LYMPHOCYTES # BLD AUTO: 1.5 K/UL
LYMPHOCYTES NFR BLD: 40.2 %
MCH RBC QN AUTO: 28.7 PG
MCHC RBC AUTO-ENTMCNC: 32.3 G/DL
MCV RBC AUTO: 89 FL
MONOCYTES # BLD AUTO: 0.2 K/UL
MONOCYTES NFR BLD: 6.6 %
NEUTROPHILS # BLD AUTO: 1.8 K/UL
NEUTROPHILS NFR BLD: 50.1 %
NONHDLC SERPL-MCNC: 105 MG/DL
PLATELET # BLD AUTO: 204 K/UL
PMV BLD AUTO: 9.9 FL
POTASSIUM SERPL-SCNC: 4.3 MMOL/L
PROT SERPL-MCNC: 7.5 G/DL
RBC # BLD AUTO: 5.13 M/UL
SODIUM SERPL-SCNC: 139 MMOL/L
TRIGL SERPL-MCNC: 74 MG/DL
TSH SERPL DL<=0.005 MIU/L-ACNC: 0.99 UIU/ML
WBC # BLD AUTO: 3.66 K/UL

## 2018-11-19 PROCEDURE — 99395 PREV VISIT EST AGE 18-39: CPT | Mod: 25,S$GLB,, | Performed by: FAMILY MEDICINE

## 2018-11-19 PROCEDURE — 80061 LIPID PANEL: CPT

## 2018-11-19 PROCEDURE — 86592 SYPHILIS TEST NON-TREP QUAL: CPT

## 2018-11-19 PROCEDURE — 90686 IIV4 VACC NO PRSV 0.5 ML IM: CPT | Mod: S$GLB,,, | Performed by: FAMILY MEDICINE

## 2018-11-19 PROCEDURE — 86703 HIV-1/HIV-2 1 RESULT ANTBDY: CPT

## 2018-11-19 PROCEDURE — 83036 HEMOGLOBIN GLYCOSYLATED A1C: CPT

## 2018-11-19 PROCEDURE — 80053 COMPREHEN METABOLIC PANEL: CPT

## 2018-11-19 PROCEDURE — 90471 IMMUNIZATION ADMIN: CPT | Mod: S$GLB,,, | Performed by: FAMILY MEDICINE

## 2018-11-19 PROCEDURE — 36415 COLL VENOUS BLD VENIPUNCTURE: CPT

## 2018-11-19 PROCEDURE — 82306 VITAMIN D 25 HYDROXY: CPT

## 2018-11-19 PROCEDURE — 84443 ASSAY THYROID STIM HORMONE: CPT

## 2018-11-19 PROCEDURE — 85025 COMPLETE CBC W/AUTO DIFF WBC: CPT

## 2018-11-19 PROCEDURE — 99999 PR PBB SHADOW E&M-EST. PATIENT-LVL IV: CPT | Mod: PBBFAC,,, | Performed by: FAMILY MEDICINE

## 2018-11-19 RX ORDER — ALBUTEROL SULFATE 90 UG/1
AEROSOL, METERED RESPIRATORY (INHALATION)
Qty: 1 EACH | Refills: 5 | OUTPATIENT
Start: 2018-11-19 | End: 2023-04-25

## 2018-11-19 NOTE — PROGRESS NOTES
Office Visit    Patient Name: Salvatore Bobby    : 1995  MRN: [id number]    Subjective:  Salvatore GUO is a 23 y.o. male who presents today for:    Annual Exam; Nasal Congestion; Sore Throat; and Headache    Salvatore Bobby presents today for annual wellness exam and monitoring of chronic conditions-   H/O exercise induced asthma. H/o Concussion 2018 evaluated by DR Raygoza with complete resolution of symptoms.      They have been feeling overall well. Asthma symptoms stable-- rare use of inhaler. Diarrhea (previously thought to be possibly related to food, especially milk hypersensitivity) is better with drinking less regular milk and subbing almond milk. Testicular pain, previously evaluated by urology Dr Brown 17 is stable. 5 days worth of URI symptoms-- ST, drainage, headaches, fatigue. No cough or shortness of breath-- asthma is stable. Taking OTC antihistamines with minimal relief.      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   Rare alcohol on special occasions.  Quit smoking a few years ago. Weight is overall stable in the last year-- had gained some but lost it-- unfortunately he thinks it's more loss of muscle mass from not going to the gym.      Immunizations: TDaP 2017, otherwise pediatric immunizations up to date. FLU shot today 18     Screening Tests: STD screening and blood work today     Eye/Dental: eye/ dental up to date.           Past Medical History  Past Medical History:   Diagnosis Date    Chest pain, unspecified     Exercise-induced asthma     Pneumothorax, left     treated with chest tube        Past Surgical History  Past Surgical History:   Procedure Laterality Date     EXCISION-PILONIDAL CYST N/A 1/6/2015    Performed by [facility]    pneumothorax         Family History  Family History   Problem Relation Age of Onset    Hyperlipidemia Father     Heart disease Paternal Grandmother     Hyperlipidemia Mother     Heart disease Maternal Grandmother     Hyperlipidemia Maternal Grandmother     Hypertension Maternal Grandmother     Diabetes Maternal Grandmother     Diabetes Paternal Grandfather        Social History  Social History     Socioeconomic History    Marital status: Single     Spouse name: Not on file    Number of children: Not on file    Years of education: Not on file    Highest education level: Not on file   Social Needs    Financial resource strain: Not on file    Food insecurity - worry: Not on file    Food insecurity - inability: Not on file    Transportation needs - medical: Not on file    Transportation needs - non-medical: Not on file   Occupational History     Employer: Student   Tobacco Use    Smoking status: Former Smoker     Types: Cigarettes    Smokeless tobacco: Never Used    Tobacco comment: quit in 2012/2013 after pneumothorax   Substance and Sexual Activity    Alcohol use: Yes     Comment: occasionally    Drug use: Not on file    Sexual activity: Yes     Partners: Female   Other Topics Concern    Not on file   Social History Narrative    Plays soccerDad smokes intermittentlyNo pets       Current Medications  Medications reviewed and updated.     Allergies   Review of patient's allergies indicates:  No Known Allergies    Review of Systems (Pertinent positives)  Review of Systems   Constitutional: Positive for fatigue (with recent URI ). Negative for fever.   HENT: Positive for congestion and postnasal drip.    Eyes: Negative for visual disturbance.   Respiratory: Negative for shortness of breath and wheezing.    Cardiovascular: Negative for chest pain.   Gastrointestinal: Negative for constipation and diarrhea.    Genitourinary: Positive for testicular pain (stable, prev evaluated by urology). Negative for discharge and dysuria.   Musculoskeletal: Negative for back pain.   Allergic/Immunologic: Positive for environmental allergies.   Neurological: Positive for headaches (with recent URI). Negative for dizziness.   Psychiatric/Behavioral: Positive for sleep disturbance (mild).       /65 (BP Location: Right arm, Patient Position: Sitting, BP Method: Large (Automatic))   Pulse 79   Temp 98.4 °F (36.9 °C) (Oral)   Ht 6' (1.829 m)   Wt 82 kg (180 lb 12.4 oz)   SpO2 97%   BMI 24.52 kg/m²     Physical Exam   Constitutional: He is oriented to person, place, and time. He appears well-developed and well-nourished. No distress.   HENT:   Head: Normocephalic and atraumatic.   Right Ear: External ear normal.   Left Ear: External ear normal.   Nose: Nose normal.   Mouth/Throat: Oropharynx is clear and moist. No oropharyngeal exudate.   Eyes: Conjunctivae are normal. No scleral icterus.   Neck: No tracheal deviation present. No thyromegaly present.   Cardiovascular: Normal rate, regular rhythm, normal heart sounds and intact distal pulses.   Pulmonary/Chest: Effort normal and breath sounds normal.   Abdominal: Soft. Bowel sounds are normal. He exhibits no distension and no mass. There is no tenderness. No hernia.   Musculoskeletal: Normal range of motion.   Lymphadenopathy:     He has no cervical adenopathy.   Neurological: He is alert and oriented to person, place, and time. He has normal reflexes.   Skin: Skin is warm and dry. No rash noted.   Psychiatric: He has a normal mood and affect.   Vitals reviewed.        Assessment/Plan:  Salvatore Bobby is a 23 y.o. male who presents today for :    Salvatore GUO was seen today for annual exam, nasal congestion, sore throat and headache.    Diagnoses and all orders for this visit:    Routine general medical examination at a health care facility  Comments:  health maintenance reviewed:  labs & STD screening ordered, FLU shot given. advised on healthy diet, regular exercise, eye/dental exams  Orders:  -     Comprehensive metabolic panel; Future  -     Hemoglobin A1c; Future  -     Lipid panel; Future  -     CBC auto differential; Future  -     TSH; Future  -     Vitamin D; Future  -     HIV 1 / 2 ANTIBODY; Future  -     RPR; Future  -     C. trachomatis/N. gonorrhoeae by AMP DNA; Future    BMI 25.0-25.9,adult    Exercise-induced asthma  Comments:  stable, albuterol PRN    Orders:  -     albuterol (PROVENTIL/VENTOLIN HFA) 90 mcg/actuation inhaler; Inhale 1-2 puffs 20-30 minutes before exercise as needed.    Viral upper respiratory infection  Comments:  Continue over-the-counter analgesics, antihistamine.  Notify symptoms persisting beyond another week.    Former smoker    History of pneumothorax    Vitamin D deficiency  -     Vitamin D; Future    Insomnia, unspecified type    Screening examination for STD (sexually transmitted disease)  -     HIV 1 / 2 ANTIBODY; Future  -     RPR; Future  -     C. trachomatis/N. gonorrhoeae by AMP DNA; Future    Need for influenza vaccination  -     Influenza - Quadrivalent (3 years & older) (PF)            ICD-10-CM ICD-9-CM    1. Routine general medical examination at a health care facility Z00.00 V70.0 Comprehensive metabolic panel      Hemoglobin A1c      Lipid panel      CBC auto differential      TSH      Vitamin D      HIV 1 / 2 ANTIBODY      RPR      C. trachomatis/N. gonorrhoeae by AMP DNA    health maintenance reviewed: labs & STD screening ordered, FLU shot given. advised on healthy diet, regular exercise, eye/dental exams   2. BMI 25.0-25.9,adult Z68.25 V85.21    3. Exercise-induced asthma J45.990 493.81 albuterol (PROVENTIL/VENTOLIN HFA) 90 mcg/actuation inhaler    stable, albuterol PRN     4. Viral upper respiratory infection J06.9 465.9     Continue over-the-counter analgesics, antihistamine.  Notify symptoms persisting beyond another week.   5. Former  smoker Z87.891 V15.82    6. History of pneumothorax Z87.09 V12.69    7. Vitamin D deficiency E55.9 268.9 Vitamin D   8. Insomnia, unspecified type G47.00 780.52    9. Screening examination for STD (sexually transmitted disease) Z11.3 V74.5 HIV 1 / 2 ANTIBODY      RPR      C. trachomatis/N. gonorrhoeae by AMP DNA   10. Need for influenza vaccination Z23 V04.81 Influenza - Quadrivalent (3 years & older) (PF)       Patient Instructions   Contact office if still bothered by upper respiratory symptoms in another week. Continue over the counter treatments        Follow-up for return as needed for new concerns.

## 2018-11-19 NOTE — LETTER
November 19, 2018      Other  [address]  [place]  [facility] [address]           [address]  [place] [address]  Phone: [phone]  Fax: [phone]          Patient: Salvatore Bobby   MR Number: [id number]   YOB: 1995   Date of Visit: 11/19/2018       Dear Other:    Thank you for referring Salvatore Bobby to me for evaluation. Attached you will find relevant portions of my assessment and plan of care.    If you have questions, please do not hesitate to call me. I look forward to following Salvatore Bobby along with you.    Sincerely,    Macarena Alberto MD    Enclosure  CC:  No Recipients    If you would like to receive this communication electronically, please contact [email or web address] or [phone] to request more information on [facility] access.    For providers and/or their staff who would like to refer a patient to [facility], please contact us through our one-stop-shop provider referral line, [facility] , at [phone].    If you feel you have received this communication in error or would no longer like to receive these types of communications, please e-mail [email or web address]

## 2018-11-19 NOTE — PATIENT INSTRUCTIONS
Contact office if still bothered by upper respiratory symptoms in another week. Continue over the counter treatments

## 2018-11-20 LAB
HIV 1+2 AB+HIV1 P24 AG SERPL QL IA: NEGATIVE
RPR SER QL: NORMAL

## 2019-02-13 ENCOUNTER — OFFICE VISIT (OUTPATIENT)
Dept: FAMILY MEDICINE | Facility: CLINIC | Age: 24
End: 2019-02-13
Payer: COMMERCIAL

## 2019-02-13 VITALS
SYSTOLIC BLOOD PRESSURE: 125 MMHG | DIASTOLIC BLOOD PRESSURE: 61 MMHG | TEMPERATURE: 98 F | WEIGHT: 183.88 LBS | OXYGEN SATURATION: 98 % | HEIGHT: 73 IN | HEART RATE: 78 BPM | BODY MASS INDEX: 24.37 KG/M2

## 2019-02-13 DIAGNOSIS — M25.511 ACUTE PAIN OF RIGHT SHOULDER: Primary | ICD-10-CM

## 2019-02-13 PROCEDURE — 3008F PR BODY MASS INDEX (BMI) DOCUMENTED: ICD-10-PCS | Mod: CPTII,S$GLB,, | Performed by: FAMILY MEDICINE

## 2019-02-13 PROCEDURE — 3008F BODY MASS INDEX DOCD: CPT | Mod: CPTII,S$GLB,, | Performed by: FAMILY MEDICINE

## 2019-02-13 PROCEDURE — 99999 PR PBB SHADOW E&M-EST. PATIENT-LVL IV: ICD-10-PCS | Mod: PBBFAC,,, | Performed by: FAMILY MEDICINE

## 2019-02-13 PROCEDURE — 99214 OFFICE O/P EST MOD 30 MIN: CPT | Mod: S$GLB,,, | Performed by: FAMILY MEDICINE

## 2019-02-13 PROCEDURE — 99214 PR OFFICE/OUTPT VISIT, EST, LEVL IV, 30-39 MIN: ICD-10-PCS | Mod: S$GLB,,, | Performed by: FAMILY MEDICINE

## 2019-02-13 PROCEDURE — 99999 PR PBB SHADOW E&M-EST. PATIENT-LVL IV: CPT | Mod: PBBFAC,,, | Performed by: FAMILY MEDICINE

## 2019-02-13 RX ORDER — DICLOFENAC SODIUM 75 MG/1
75 TABLET, DELAYED RELEASE ORAL 2 TIMES DAILY PRN
Qty: 60 TABLET | Refills: 3 | Status: SHIPPED | OUTPATIENT
Start: 2019-02-13 | End: 2019-03-28 | Stop reason: SDUPTHER

## 2019-02-13 NOTE — PROGRESS NOTES
Office Visit    Patient Name: Salvatore Bobby    : 1995  MRN: [id number]    Subjective:  Salvatore GUO is a 24 y.o. male who presents today for:    Shoulder Pain    24-year-old generally healthy patient of mine seen for annual exam 2018 here to discuss shoulder pain.     He plays volleyball regularly and on  he reached high with his right arm to spike, came down after a hard spike and noted pain right away of his superior and posterior shoulder.  No neck or arm arm pain. He has history of some prior pain of the right shoulder, but generally is he warms up prior to playing volleyball and does some band exercises then he is fine.  This particular occasion, he did not warm up prior to playing.    Ever since this episode he has noticed a fairly sharp pain inside his shoulder, but there is no radiation down his arm.  Certain movements such as overhead movements make the pain much worse.  He also notes he cannot sleep on his right side.  He has not been taking any medications to help with the pain, but is not getting any better and has been over 2 weeks.    Past Medical History  Past Medical History:   Diagnosis Date    Chest pain, unspecified     Exercise-induced asthma     Pneumothorax, left     treated with chest tube        Past Surgical History  Past Surgical History:   Procedure Laterality Date    EXCISION-PILONIDAL CYST N/A 2015    [facility]    pneumothorax         Family History  Family History   Problem Relation Age of Onset    Hyperlipidemia Father     Heart disease Paternal Grandmother     Hyperlipidemia Mother     Heart disease Maternal Grandmother     Hyperlipidemia Maternal Grandmother     Hypertension Maternal Grandmother     Diabetes Maternal Grandmother     Diabetes Paternal Grandfather        Social History  Social History     Socioeconomic History    Marital status: Single     Spouse name: Not on file    Number of children: Not on  "file    Years of education: Not on file    Highest education level: Not on file   Social Needs    Financial resource strain: Not on file    Food insecurity - worry: Not on file    Food insecurity - inability: Not on file    Transportation needs - medical: Not on file    Transportation needs - non-medical: Not on file   Occupational History     Employer: Student   Tobacco Use    Smoking status: Former Smoker     Types: Cigarettes    Smokeless tobacco: Never Used    Tobacco comment: quit in 2012/2013 after pneumothorax   Substance and Sexual Activity    Alcohol use: Yes     Comment: occasionally    Drug use: Not on file    Sexual activity: Yes     Partners: Female   Other Topics Concern    Not on file   Social History Narrative    Plays soccerDad smokes intermittentlyNo pets       Current Medications  Medications reviewed and updated.     Allergies   Review of patient's allergies indicates:  No Known Allergies    Review of Systems (Pertinent positives)  Review of Systems   Constitutional: Negative for activity change and unexpected weight change.   HENT: Negative for hearing loss, rhinorrhea and trouble swallowing.    Eyes: Negative for discharge and visual disturbance.   Respiratory: Negative for chest tightness and wheezing.    Cardiovascular: Negative for chest pain and palpitations.   Gastrointestinal: Negative for blood in stool, constipation and vomiting.   Endocrine: Negative for polydipsia and polyuria.   Genitourinary: Negative for difficulty urinating, hematuria and urgency.   Musculoskeletal: Positive for arthralgias (right shoulder). Negative for joint swelling and neck pain.   Neurological: Negative for weakness and headaches.   Psychiatric/Behavioral: Negative for confusion and dysphoric mood.       /61   Pulse 78   Temp 97.8 °F (36.6 °C)   Ht 6' 1" (1.854 m)   Wt 83.4 kg (183 lb 13.8 oz)   SpO2 98%   BMI 24.26 kg/m²     Physical Exam   Constitutional: He is oriented to person, " place, and time. He appears well-developed and well-nourished. No distress.   Pulmonary/Chest: Effort normal.   Musculoskeletal: He exhibits no edema.        Right shoulder: He exhibits tenderness and pain (Negative Neer impingement test, positive Flores impingement test, positive Lesage's test with some concern for labral tear). He exhibits normal range of motion, no swelling, no deformity and normal strength (Normal strength of all rotator cuff muscles).   Neurological: He is alert and oriented to person, place, and time.   Psychiatric: He has a normal mood and affect.   Vitals reviewed.        Assessment/Plan:  Salvatore Bobby is a 24 y.o. male who presents today for :    Salvatore GUO was seen today for shoulder pain.    Diagnoses and all orders for this visit:    Acute pain of right shoulder  Comments:  concerns include bursitis vs pos. labral cartilage tear.Trial of anti-inflammatory/physical therapy for 6 wks, return if symptoms persist/worsen& will order MRI  Orders:  -     diclofenac (VOLTAREN) 75 MG EC tablet; Take 1 tablet (75 mg total) by mouth 2 (two) times daily as needed.  -     Ambulatory Referral to Physical/Occupational Therapy            ICD-10-CM ICD-9-CM    1. Acute pain of right shoulder M25.511 719.41 diclofenac (VOLTAREN) 75 MG EC tablet      Ambulatory Referral to Physical/Occupational Therapy    concerns include bursitis vs pos. labral cartilage tear.Trial of anti-inflammatory/physical therapy for 6 wks, return if symptoms persist/worsen& will order MRI       Patient Instructions   concerns include bursitis vs possible labral cartilage tear. trial of anti-inflammatory and physical therapy for 6 weeks, return if symptoms persist/worsen        Follow-up in about 6 weeks (around 3/27/2019) for return as needed for new concerns.

## 2019-02-13 NOTE — PATIENT INSTRUCTIONS
concerns include bursitis vs possible labral cartilage tear. trial of anti-inflammatory and physical therapy for 6 weeks, return if symptoms persist/worsen

## 2019-02-19 ENCOUNTER — CLINICAL SUPPORT (OUTPATIENT)
Dept: REHABILITATION | Facility: HOSPITAL | Age: 24
End: 2019-02-19
Payer: COMMERCIAL

## 2019-02-19 DIAGNOSIS — M79.601 PAIN OF RIGHT UPPER EXTREMITY: ICD-10-CM

## 2019-02-19 DIAGNOSIS — R53.1 WEAKNESS: ICD-10-CM

## 2019-02-19 PROCEDURE — 97110 THERAPEUTIC EXERCISES: CPT | Mod: PN

## 2019-02-19 PROCEDURE — 97165 OT EVAL LOW COMPLEX 30 MIN: CPT | Mod: PN

## 2019-02-19 NOTE — PLAN OF CARE
[facility] Occupational Therapy  Initial Evaluation     Date: 2/19/2019  Patient: Salvatore Bobby  Chart Number: [id number]    Therapy Diagnosis:   Encounter Diagnoses   Name Primary?    Pain of right upper extremity     Weakness      Physician: Macarena Alberto MD    Physician Orders: OT eval and treat  Medical Diagnosis: M25.511 (ICD-10-CM) - Acute pain of right shoulder  Evaluation Date: 2/19/2019  Insurance Authorization period Expiration: 12/31/2019  Plan of Care Expiration Period: 4/19/2019    Visit # / Visits Authortized: 1 / 60  Time In:710  Time Out: 750  Total Billable Time: 40 minutes  LCE1 TE1    Precautions: Standard    Subjective     Involved Side: Right  Dominant Side: Right  Date of Onset: January 28th  Mechanism of Injury: Pt states he was playing volleyball and went to go spike ball and started with increased pain with progressively worsening symptoms after.   History of Current Condition: Pt presents to clinic today with decreased ROM, weakness and pain all of which limit pt functionally  Surgical Procedure: none  Imaging: none   Previous Therapy: none    Patient's Goals for Therapy: to decrease pain and increase functional use, to sleep on Right side and play volleyball pain free.     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     Current FunctionalStatus   Home/Living environment : lives with their family      Limitation of Functional Status as follows:   ADLs/IADLs:     - Feeding:  Independent    - Bathing: Independent    - Dressing/Grooming: Independent    - Driving: Independent     Leisure: volleyball, soccer      Past Medical History/Physical Systems Review:   Salvatore Bobby  has a past medical history of Chest pain, unspecified, Exercise-induced asthma, and Pneumothorax, left.    Salvatore Bobby  has a past surgical history that includes pneumothorax.    Salvatore GUO has a current medication list which includes the following prescription(s): albuterol and diclofenac.    Review of patient's allergies indicates:  No Known Allergies       Objective     Sensation Test: Patient denies any numbness/tingling    Observation/Inspection:  rounded shoulders    Range of Motion/Strength:   Shoulder  Left   Right  Pain/Dysfunction with Movement    AROM PROM MMT AROM PROM MMT    Flexion WNL WNL  WNL 4/5    Extension WNL WNL WNL 45* WNL 5/5    Abduction WNL WNL * WNL 4-/5    HorizAdduction WNL WNL WNL 40 WNL 4/5    Internal rotation WNL WNL WNL L4* WNL 4/5    ER at 90° abd WNL WNL WNL 90 WL 4/5    ER at 0° abd WNL WNL WNL 90* WNL 4/5      ROM Comments: Pain at end range    Painful Arc: none noted    Tenderness upon Palpation:      Positive: Supraspinatus Region    Special Tests:  AC Joint Left Right   Empty Can Test - +   Hawkin's Kenndy - +   Neer's Test - +     Scapular Control/Dyskinesis:    Normal / Subtle / Obvious  Comments    Left  subtle -    Right  subtle -     CMS Impairment/Limitation/Restriction for FOTO Shoulder Survey    Therapist reviewed FOTO scores for Salvatore Bobby on 2/19/2019.   FOTO documents entered into [employer] - see Media section.    Limitation Score: 32%  Category: Self Care  Current : CJ = at least 20% but < 40% impaired, limited or restricted  Goal: CH = 0 % impaired, limited or restricted         Treatment     Treatment Time In: 740  Treatment Time Out: 750  Total Treatment time separate from Evaluation time:10    Salvatore received therapeutic exercises for 10 minutes  "including:  -Shoulder flexion with the wand 5 repetitions, Sidelying Abduction 5 repetitions, Sidelying External Rotation 5 repetitions, Standing Dowel Abduction 5 repetitions, Theraband pull downs 5 repetitions, Theraband Rows 5 repetitions, Theraband External Rotation 5 repetitions, Theraband Internal Rotation 5 repetitions, Theraband Horizontal Abduction 5 repetitions, wall pushups 5 repetitions, corner pectoralis stretch 1/30", Internal rotation pulley stretch 1/30"    Home Exercise Program/Education:  Issued HEP (see patient instructions in EMR) and educated on modality use for pain management . Exercises were reviewed and Salvatore was able to demonstrate them prior to the end of the session.   Pt received a written copy of exercises to perform at home. Salvatore demonstrated good  understanding of the education provided.  Pt was advised to perform these exercises free of pain, and to stop performing them if pain occurs.    Patient/Family Education: role of OT, goals for OT, scheduling/cancellations - pt verbalized understanding. Discussed insurance limitations with patient.    Assessment     Salvatore Bobby is a 24 y.o. male referred to outpatient occupational therapy and presents with a medical diagnosis of R shoulder pain, resulting in Decreased ROM, Decreased muscle strength and Increased pain and demonstrates limitations as described in the chart below. Following medical record review it is determined that pt will benefit from occupational therapy services in order to maximize pain free and/or functional use of right shoulder. The following goals were discussed with the patient and patient is in agreement with them as to be addressed in the treatment plan. The patient's rehab potential is Good.     Anticipated barriers to occupational therapy: none  Pt has no cultural, educational or language barriers to learning provided.    Profile and History Assessment of Occupational Performance Level of Clinical Decision " Making Complexity Score   Occupational Profile:   Salvatore Bobby is a 24 y.o. male who lives with their family and is currently employed as . Salvatore Bobby has difficulty with  housework/household chores and volleyball, soccer  affecting his/her daily functional abilities. His/her main goal for therapy is to decrease pain and increase functional use.     Comorbidities:   COPD/asthma    Medical and Therapy History Review:   Expanded               Performance Deficits    Physical:  No Deficits  Joint Mobility  Joint Stability  Muscle Power/Strength  Muscle Endurance  Muscle Tone  Postural Control  Pain    Cognitive:  No Deficits    Psychosocial:    No Deficits     Clinical Decision Making:  low    Assessment Process:  Problem-Focused Assessments    Modification/Need for Assistance:  Not Necessary    Intervention Selection:  Several Treatment Options       low  Based on PMHX, co morbidities , data from assessments and functional level of assistance required with task and clinical presentation directly impacting function.       The following goals were discussed with the patient and patient is in agreement with them as to be addressed in the treatment plan.     Goals:     Short Term Goals to be met in 4 weeks: (3/19/2019)  1) Initiate Hep   2) Pt will increase R shoulder AROM by 10 degrees grossly for improved performance with overhead ADL's  3) Pt will report 5/10 pain in (R)shoulder at worst  4) Pt will demonstrate increased MMT to 4+/5 grossly R shoulder  5) Patient will be able to achieve less than or equal to 15% on FOTO shoulder survey demonstrating overall improved functional ability with upper extremity.     Long Term Goals to be met by discharge:  1) Independent with HEP  2) Pt will demonstrate (R) shoulder AROM WNL grossly for [place] with ADL's  3) Pt will demonstrate (R) shoulder MMT WNL grossly for [place] with functional activities  4) Independent and pain free with ADL's  and IADL's  5) Patient will be able to achieve less than or equal to 0% on FOTO shoulder survey demonstrating overall improved functional ability with upper extremity.       Plan   Certification Period/Plan of care expiration: 2/19/2019 to 4/19/2019.    Outpatient Occupational Therapy 1 times weekly for 8 weeks to include the following interventions: Manual therapy/joint mobilizations, Modalities for pain management, Therapeutic exercises/activities., Strengthening, Joint Protection and Energy Conservation.      TAWANA Wong

## 2019-02-21 ENCOUNTER — CLINICAL SUPPORT (OUTPATIENT)
Dept: REHABILITATION | Facility: HOSPITAL | Age: 24
End: 2019-02-21
Payer: COMMERCIAL

## 2019-02-21 DIAGNOSIS — M79.601 PAIN OF RIGHT UPPER EXTREMITY: ICD-10-CM

## 2019-02-21 DIAGNOSIS — R53.1 WEAKNESS: ICD-10-CM

## 2019-02-21 PROCEDURE — 97110 THERAPEUTIC EXERCISES: CPT | Mod: PN

## 2019-02-21 NOTE — PROGRESS NOTES
"  Occupational Therapy Daily Treatment Note     Date: 2/21/2019  Name: Salvatore Bobby  Clinic Number: [id number]    Therapy Diagnosis:   Encounter Diagnoses   Name Primary?    Pain of right upper extremity     Weakness      Physician: Macarena Alberto MD    Physician Orders: OT eval and treat  Medical Diagnosis: M25.511 (ICD-10-CM) - Acute pain of right shoulder  Evaluation Date: 2/19/2019  Insurance Authorization period Expiration: 12/31/2019  Plan of Care Expiration Period: 4/19/2019     Visit # / Visits Authortized: 2 / 60  Time In:705  Time Out: 810  Total Billable Time: 65 minutes  TE4    Precautions: Standard    Subjective     Pt reports: "I've been doing my exercises."  he was compliant with home exercise program given last session.   Response to previous treatment:decreased pain   Functional change: able to reach higher behind back    Pain: 3/10  Location: right shoulder      Objective     Pt on UBE for/rev @90 rpms x 6 minutes prior to session    Salvatore received therapeutic exercises for 55 minutes including:  Exercises        Supine pectoralis stretch 3/30"   Supine butterfly stretch 3/30"   Supine dowel Flexion 3  2/15   Supine serratus punch 4  2/15   Sidelying Abduction 3  2/15   Sidelying External Rotation 3  2/15   Sidelying Gator 3  2/15   Sleeper Stretch 3/30"           Wall clocks  red plyo band  2/10   Corner pectoralis stretch 3/30"   Theraband Lats blue  2/15   Theraband Rows blue  2/15   Theraband Internal Rotation/External Rotation blue  2/15   Horizontal Abduction Blue  2/15   Internal Rotation pulley stretch 3/30"   CP x 10 minutes for pain management after session  Home Exercises and Education Provided     Education provided:   - Progress towards goals     Written Home Exercises Provided: Patient instructed to cont prior HEP.  Exercises were reviewed and Salvatore was able to demonstrate them prior to the end of the session.  Salvatore demonstrated good  understanding of the HEP provided. "     See EMR under Patient Instructions for exercises provided 2/19/2019     Assessment   Pt participated well this date. Pain free throughout session he just reported fatigue after session. Good technique with all exercises. Able to increase weight without c/o. Pr very motivated. Good candidate for OT.      Salvatore is progressing well towards his goals and there are no updates to goals at this time. Pt prognosis is Good.     Pt will continue to benefit from skilled outpatient occupational therapy to address the deficits listed in the problem list on initial evaluation provide pt/family education and to maximize pt's level of independence in the home and community environment.     Anticipated barriers to occupational therapy: none    Pt's spiritual, cultural and educational needs considered and pt agreeable to plan of care and goals.    Goals:    Short Term Goals to be met in 4 weeks: (3/19/2019)  1) Initiate Hep-not met, progressing  2) Pt will increase R shoulder AROM by 10 degrees grossly for improved performance with overhead ADL's-not met, progressing  3) Pt will report 5/10 pain in (R)shoulder at worst-not met, progressing  4) Pt will demonstrate increased MMT to 4+/5 grossly R shoulder-not met, progressing  5) Patient will be able to achieve less than or equal to 15% on FOTO shoulder survey demonstrating overall improved functional ability with upper extremity-not met, progressing .      Long Term Goals to be met by discharge:  1) Independent with HEP  2) Pt will demonstrate (R) shoulder AROM WNL grossly for [place] with ADL's  3) Pt will demonstrate (R) shoulder MMT WNL grossly for [place] with functional activities  4) Independent and pain free with ADL's and IADL's  5) Patient will be able to achieve less than or equal to 0% on FOTO shoulder survey demonstrating overall improved functional ability with upper extremity.     Plan   Cont with OT POC  Updates/Grading for next session: progress with  strengthening      TAWANA Wong

## 2019-02-28 ENCOUNTER — CLINICAL SUPPORT (OUTPATIENT)
Dept: REHABILITATION | Facility: HOSPITAL | Age: 24
End: 2019-02-28
Payer: COMMERCIAL

## 2019-02-28 DIAGNOSIS — R53.1 WEAKNESS: ICD-10-CM

## 2019-02-28 DIAGNOSIS — M79.601 PAIN OF RIGHT UPPER EXTREMITY: ICD-10-CM

## 2019-02-28 PROCEDURE — 97110 THERAPEUTIC EXERCISES: CPT | Mod: PN

## 2019-02-28 NOTE — PROGRESS NOTES
"  Occupational Therapy Daily Treatment Note     Date: 2/28/2019  Name: Salvatore Bobby  Clinic Number: [id number]    Therapy Diagnosis:   Encounter Diagnoses   Name Primary?    Pain of right upper extremity     Weakness      Physician: Macarena Alberto MD    Physician Orders: OT eval and treat  Medical Diagnosis: M25.511 (ICD-10-CM) - Acute pain of right shoulder  Evaluation Date: 2/19/2019  Insurance Authorization period Expiration: 12/31/2019  Plan of Care Expiration Period: 4/19/2019     Visit # / Visits Authortized: 3 / 60  Time In:705  Time Out: 808  Total Billable Time: 63 minutes  TE4    Precautions: Standard    Subjective     Pt reports: "I've been doing my exercises it still hurts here though." Pt points to Posterior deltoid  he was compliant with home exercise program given last session.   Response to previous treatment:decreased pain   Functional change: able to reach higher behind back    Pain: 3/10  Location: right shoulder      Objective     Pt on UBE for/rev @90 rpms x 6 minutes prior to session    Salvatore received therapeutic exercises for 47 minutes including:  Exercises        Supine pectoralis stretch 3/30"   Supine butterfly stretch 3/30"   Supine dowel Flexion 3  2/15   Supine serratus punch 4  2/15   Sidelying Abduction 3  2/15   Sidelying External Rotation 3  2/15   Sidelying Gator 3  2/15   Sleeper Stretch 3/30"           Wall clocks  red plyo band  2/10   Corner pectoralis stretch 3/30"   Theraband Lats blue  2/15   Theraband Rows blue  2/15   Theraband Internal Rotation/External Rotation blue  2/15   Horizontal Abduction Blue  2/15   Internal Rotation pulley stretch 3/30"   CP x 10 minutes for pain management after session  Home Exercises and Education Provided     Education provided:   - Progress towards goals     Written Home Exercises Provided: Patient instructed to cont prior HEP.  Exercises were reviewed and Salvatore was able to demonstrate them prior to the end of the session.  Salvatore " "demonstrated good  understanding of the HEP provided.     See EMR under Patient Instructions for exercises provided 2/19/2019     Assessment   Pt participated well this date. Tolerated established exercises well with min cues for techniques and appropriate movements with non compensatory strategies. He had minor fatigue with exercises. No real pain but states "he can really feel it" when completing some movements specifically horizontal abduction of the shoulder. Pt very motivated requesting to increase therapy to 2x/wk. Good candidate for OT.      Salvatore is progressing well towards his goals and there are no updates to goals at this time. Pt prognosis is Good.     Pt will continue to benefit from skilled outpatient occupational therapy to address the deficits listed in the problem list on initial evaluation provide pt/family education and to maximize pt's level of independence in the home and community environment.     Anticipated barriers to occupational therapy: none    Pt's spiritual, cultural and educational needs considered and pt agreeable to plan of care and goals.    Goals:    Short Term Goals to be met in 4 weeks: (3/19/2019)  1) Initiate Hep-not met, progressing  2) Pt will increase R shoulder AROM by 10 degrees grossly for improved performance with overhead ADL's-not met, progressing  3) Pt will report 5/10 pain in (R)shoulder at worst-not met, progressing  4) Pt will demonstrate increased MMT to 4+/5 grossly R shoulder-not met, progressing  5) Patient will be able to achieve less than or equal to 15% on FOTO shoulder survey demonstrating overall improved functional ability with upper extremity-not met, progressing .      Long Term Goals to be met by discharge:  1) Independent with HEP  2) Pt will demonstrate (R) shoulder AROM WNL grossly for [place] with ADL's  3) Pt will demonstrate (R) shoulder MMT WNL grossly for [place] with functional activities  4) Independent and pain free with ADL's and " IADL's  5) Patient will be able to achieve less than or equal to 0% on FOTO shoulder survey demonstrating overall improved functional ability with upper extremity.     Plan   Cont with OT POC  Updates/Grading for next session: progress with strengthening      Nilesh Crow, OT

## 2019-03-07 ENCOUNTER — CLINICAL SUPPORT (OUTPATIENT)
Dept: REHABILITATION | Facility: HOSPITAL | Age: 24
End: 2019-03-07
Payer: COMMERCIAL

## 2019-03-07 DIAGNOSIS — R53.1 WEAKNESS: ICD-10-CM

## 2019-03-07 DIAGNOSIS — M79.601 PAIN OF RIGHT UPPER EXTREMITY: ICD-10-CM

## 2019-03-07 PROCEDURE — 97110 THERAPEUTIC EXERCISES: CPT | Mod: PN

## 2019-03-07 NOTE — PROGRESS NOTES
"  Occupational Therapy Daily Treatment Note     Date: 3/7/2019  Name: Salvatore Bobby  Clinic Number: [id number]    Therapy Diagnosis:   Encounter Diagnoses   Name Primary?    Pain of right upper extremity     Weakness      Physician: Macarena Alberto MD    Physician Orders: OT eval and treat  Medical Diagnosis: M25.511 (ICD-10-CM) - Acute pain of right shoulder  Evaluation Date: 2/19/2019  Insurance Authorization period Expiration: 12/31/2019  Plan of Care Expiration Period: 4/19/2019     Visit # / Visits Authortized: 4 / 60  Time In:705  Time Out: 805  Total Billable Time: 60 minutes  TE4    Precautions: Standard    Subjective     Pt reports: "I feel like it's getting better it just hurts with certain movements."  he was compliant with home exercise program given last session.   Response to previous treatment:decreased pain   Functional change: able to reach higher behind back    Pain: 2/10  Location: right shoulder      Objective     Pt on UBE for/rev @90 rpms x 6 minutes prior to session    Salvatore received therapeutic exercises for 50 minutes including:  Exercises        Supine pectoralis stretch 3/30"   Supine butterfly stretch 3/30"   Supine dumbell flexion 3  2/15   Supine serratus punch 4  2/15   Sidelying Abduction 3  2/15   Sidelying External Rotation 3  2/15   Sidelying Gator 3  2/15   Sleeper Stretch 3/30"           Wall clocks  red plyo band  2/10   Corner pectoralis stretch 3/30"   Theraband Lats blue  2/15   Theraband Rows blue  2/15   Theraband Internal Rotation/External Rotation blue  2/15   Horizontal Abduction Blue  2/15   Internal Rotation pulley stretch 3/30"   CP x 10 minutes for pain management after session  Home Exercises and Education Provided     Education provided:   - Progress towards goals     Written Home Exercises Provided: Patient instructed to cont prior HEP.  Exercises were reviewed and Salvatore was able to demonstrate them prior to the end of the session.  Salvatore demonstrated good  " understanding of the HEP provided.     See EMR under Patient Instructions for exercises provided 2/19/2019     Assessment   Pt participated well this date. He reported decreased pain today. Good endurance and technique noted with therex. Pt motivated.   Salvatore is progressing well towards his goals and there are no updates to goals at this time. Pt prognosis is Good.     Pt will continue to benefit from skilled outpatient occupational therapy to address the deficits listed in the problem list on initial evaluation provide pt/family education and to maximize pt's level of independence in the home and community environment.     Anticipated barriers to occupational therapy: none    Pt's spiritual, cultural and educational needs considered and pt agreeable to plan of care and goals.    Goals:    Short Term Goals to be met in 4 weeks: (3/19/2019)  1) Initiate Hep-not met, progressing  2) Pt will increase R shoulder AROM by 10 degrees grossly for improved performance with overhead ADL's-not met, progressing  3) Pt will report 5/10 pain in (R)shoulder at worst-not met, progressing  4) Pt will demonstrate increased MMT to 4+/5 grossly R shoulder-not met, progressing  5) Patient will be able to achieve less than or equal to 15% on FOTO shoulder survey demonstrating overall improved functional ability with upper extremity-not met, progressing .      Long Term Goals to be met by discharge:  1) Independent with HEP  2) Pt will demonstrate (R) shoulder AROM WNL grossly for [place] with ADL's  3) Pt will demonstrate (R) shoulder MMT WNL grossly for [place] with functional activities  4) Independent and pain free with ADL's and IADL's  5) Patient will be able to achieve less than or equal to 0% on FOTO shoulder survey demonstrating overall improved functional ability with upper extremity.     Plan   Cont with OT POC  Updates/Grading for next session: progress with strengthening      TAWANA Wong

## 2019-03-12 ENCOUNTER — CLINICAL SUPPORT (OUTPATIENT)
Dept: REHABILITATION | Facility: HOSPITAL | Age: 24
End: 2019-03-12
Payer: COMMERCIAL

## 2019-03-12 DIAGNOSIS — R53.1 WEAKNESS: ICD-10-CM

## 2019-03-12 DIAGNOSIS — M79.601 PAIN OF RIGHT UPPER EXTREMITY: ICD-10-CM

## 2019-03-12 PROCEDURE — 97110 THERAPEUTIC EXERCISES: CPT | Mod: PN

## 2019-03-12 NOTE — PROGRESS NOTES
"  Occupational Therapy Daily Treatment Note     Date: 3/12/2019  Name: Salvatore Bobby  Clinic Number: [id number]    Therapy Diagnosis:   Encounter Diagnoses   Name Primary?    Pain of right upper extremity     Weakness      Physician: Macarena Alberto MD    Physician Orders: OT eval and treat  Medical Diagnosis: M25.511 (ICD-10-CM) - Acute pain of right shoulder  Evaluation Date: 2/19/2019  Insurance Authorization period Expiration: 12/31/2019  Plan of Care Expiration Period: 4/19/2019     Visit # / Visits Authortized: 5/ 60  Time In:705am  Time Out: 730am  Total Billable Time: 25 minutes  TE2    Precautions: Standard    Subjective     Pt reports: "It is feeling ok"   he was compliant with home exercise program given last session.   Response to previous treatment:decreased pain   Functional change: able to reach higher behind back    Pain: 2/10  Location: right shoulder      Objective     Pt on UBE for/rev @90 rpms x 6 minutes prior to session    Salvatore received therapeutic exercises for 25 minutes including:- all standing therex completed with RD España, OTR/L, CHT  Exercises        Supine pectoralis stretch 3/30"   Supine butterfly stretch 3/30"   Supine dumbell flexion 3  2/15   Supine serratus punch 4  2/15   Sidelying Abduction 3  2/15   Sidelying External Rotation 3  2/15   Sidelying Gator 3  2/15   Sleeper Stretch 3/30"       All the following therex completed with RD España, OTR/L, CHT    Wall clocks  red plyo band  2/10   Corner pectoralis stretch 3/30"   Theraband Lats blue  2/15   Theraband Rows blue  2/15   Theraband Internal Rotation/External Rotation blue  2/15   Horizontal Abduction Blue  2/15   Internal Rotation pulley stretch 3/30"   CP x 10 minutes for pain management after session  Home Exercises and Education Provided     Education provided:   - Progress towards goals     Written Home Exercises Provided: Patient instructed to cont prior HEP.  Exercises were reviewed and Salvatore" was able to demonstrate them prior to the end of the session.  Salvatore demonstrated good  understanding of the HEP provided.     See EMR under Patient Instructions for exercises provided 2/19/2019     Assessment   Pt participated well this date. He reported decreased pain today. Good endurance and technique noted with therex. Pt motivated.   Salvatore is progressing well towards his goals and there are no updates to goals at this time. Pt prognosis is Good.     Pt will continue to benefit from skilled outpatient occupational therapy to address the deficits listed in the problem list on initial evaluation provide pt/family education and to maximize pt's level of independence in the home and community environment.     Anticipated barriers to occupational therapy: none    Pt's spiritual, cultural and educational needs considered and pt agreeable to plan of care and goals.    Goals:    Short Term Goals to be met in 4 weeks: (3/19/2019)  1) Initiate Hep-not met, progressing  2) Pt will increase R shoulder AROM by 10 degrees grossly for improved performance with overhead ADL's-not met, progressing  3) Pt will report 5/10 pain in (R)shoulder at worst-not met, progressing  4) Pt will demonstrate increased MMT to 4+/5 grossly R shoulder-not met, progressing  5) Patient will be able to achieve less than or equal to 15% on FOTO shoulder survey demonstrating overall improved functional ability with upper extremity-not met, progressing .      Long Term Goals to be met by discharge:  1) Independent with HEP  2) Pt will demonstrate (R) shoulder AROM WNL grossly for [place] with ADL's  3) Pt will demonstrate (R) shoulder MMT WNL grossly for [place] with functional activities  4) Independent and pain free with ADL's and IADL's  5) Patient will be able to achieve less than or equal to 0% on FOTO shoulder survey demonstrating overall improved functional ability with upper extremity.     Plan   Cont with OT POC  Updates/Grading for  next session: progress with strengthening      Beulah Taylor, OT

## 2019-03-12 NOTE — PROGRESS NOTES
"  Occupational Therapy Daily Treatment Note     Date: 3/12/2019  Name: Salvatore Bobby  Clinic Number: [id number]    Therapy Diagnosis:   Encounter Diagnoses   Name Primary?    Pain of right upper extremity     Weakness      Physician: Macarena Alberto MD    Physician Orders: OT eval and treat  Medical Diagnosis: M25.511 (ICD-10-CM) - Acute pain of right shoulder  Evaluation Date: 2/19/2019  Insurance Authorization period Expiration: 12/31/2019  Plan of Care Expiration Period: 4/19/2019     Visit # / Visits Authortized: 5 / 60  Time In:730  Time Out: 810  Total Billable Time: 30 minutes  TE2    Precautions: Standard    Subjective     Pt reports: "I tried working out at the gym over the weekend and it wasn't too bad."  he was compliant with home exercise program given last session.   Response to previous treatment:decreased pain   Functional change: able to reach higher behind back    Pain: 1-2/10  Location: right shoulder      Objective       Salvatore received therapeutic exercises for 30 minutes including:  Exercises        Supine pectoralis stretch 3/30"   Supine butterfly stretch 3/30"   Supine dumbell flexion 4  2/15   Supine serratus punch 4  2/15   Sidelying Abduction 4  2/15   Sidelying External Rotation 4  2/15   Sidelying Gator 4  2/15   Sleeper Stretch 3/30"             Range of Motion/Strength:   Shoulder   Right       AROM PROM MMT   Flexion 160(+10) WNL 4+/5   Extension 50(+5) WNL 5/5   Abduction 145(+15) WNL 4+/5   HorizAdduction 45(+5) WNL 4+/5   Internal rotation L4(=) WNL 4+/5   ER at 90° abd 90(=) WL 4+/5   ER at 0° abd 90(=) WNL 4+/5      CP x 10 minutes for pain management after session  Home Exercises and Education Provided     Education provided:   - Progress towards goals     Written Home Exercises Provided: Patient instructed to cont prior HEP.  Exercises were reviewed and Salvatore was able to demonstrate them prior to the end of the session.  Salvatore demonstrated good  understanding of the HEP " provided.     See EMR under Patient Instructions for exercises provided 2/19/2019     Assessment   Pt participated well this date. He was able to increase weight without c/o as well as while demonstrating good technique. He is reporting improved functional use with decreased pain. He is motivated.  Salvatore is progressing well towards his goals and there are no updates to goals at this time. Pt prognosis is Good.     Pt will continue to benefit from skilled outpatient occupational therapy to address the deficits listed in the problem list on initial evaluation provide pt/family education and to maximize pt's level of independence in the home and community environment.     Anticipated barriers to occupational therapy: none    Pt's spiritual, cultural and educational needs considered and pt agreeable to plan of care and goals.    Goals:    Short Term Goals to be met in 4 weeks: (3/19/2019)  1) Initiate Hep-not met, progressing  2) Pt will increase R shoulder AROM by 10 degrees grossly for improved performance with overhead ADL's-not met, progressing  3) Pt will report 5/10 pain in (R)shoulder at worst-not met, progressing  4) Pt will demonstrate increased MMT to 4+/5 grossly R shoulder-not met, progressing  5) Patient will be able to achieve less than or equal to 15% on FOTO shoulder survey demonstrating overall improved functional ability with upper extremity-not met, progressing .      Long Term Goals to be met by discharge:  1) Independent with HEP  2) Pt will demonstrate (R) shoulder AROM WNL grossly for [place] with ADL's  3) Pt will demonstrate (R) shoulder MMT WNL grossly for [place] with functional activities  4) Independent and pain free with ADL's and IADL's  5) Patient will be able to achieve less than or equal to 0% on FOTO shoulder survey demonstrating overall improved functional ability with upper extremity.     Plan   Cont with OT POC  Updates/Grading for next session: progress with  strengthening      TAWANA Wong

## 2019-03-14 ENCOUNTER — CLINICAL SUPPORT (OUTPATIENT)
Dept: REHABILITATION | Facility: HOSPITAL | Age: 24
End: 2019-03-14
Payer: COMMERCIAL

## 2019-03-14 DIAGNOSIS — R53.1 WEAKNESS: ICD-10-CM

## 2019-03-14 DIAGNOSIS — M79.601 PAIN OF RIGHT UPPER EXTREMITY: ICD-10-CM

## 2019-03-14 PROCEDURE — 97110 THERAPEUTIC EXERCISES: CPT | Mod: PN

## 2019-03-14 NOTE — PROGRESS NOTES
"  Occupational Therapy Daily Treatment Note     Date: 3/14/2019  Name: Salvatore Bobby  Clinic Number: [id number]    Therapy Diagnosis:   Encounter Diagnoses   Name Primary?    Pain of right upper extremity     Weakness      Physician: Macarena Alberto MD    Physician Orders: OT eval and treat  Medical Diagnosis: M25.511 (ICD-10-CM) - Acute pain of right shoulder  Evaluation Date: 2/19/2019  Insurance Authorization period Expiration: 12/31/2019  Plan of Care Expiration Period: 4/19/2019     Visit # / Visits Authortized: 6 / 60  Time In:705  Time Out: 805  Total Billable Time: 50 minutes  TE3    Precautions: Standard    Subjective     Pt reports: "I did a lot of painting yesterday, I'm not really in pain just sore."  he was compliant with home exercise program given last session.   Response to previous treatment:decreased pain   Functional change: able to reach higher behind back    Pain: 0/10  Location: right shoulder      Objective     Pt on UBE for/rev @60 rpms x 6 minutes prior to session    Salvatore received therapeutic exercises for 50 minutes including:  Exercises        Supine pectoralis stretch 3/30"   Supine butterfly stretch 3/30"   Supine dumbell flexion 4  2/15   Supine serratus punch 4  2/15   Sidelying Abduction 4  2/15   Sidelying External Rotation 4  2/15   Sidelying Gator 4  2/15   Sleeper Stretch 3/30"       pushups 2/15   Wall clocks  red plyo band  2/10   Corner pectoralis stretch 3/30"   Theraband Lats blue  2/15   Theraband Rows blue  2/15   Theraband Internal Rotation/External Rotation blue  2/15   Horizontal Abduction Blue  2/15   Internal Rotation pulley stretch 3/30"     CP x 10 minutes for pain management after session  Home Exercises and Education Provided     Education provided:   - Progress towards goals     Written Home Exercises Provided: Patient instructed to cont prior HEP.  Exercises were reviewed and Salvatore was able to demonstrate them prior to the end of the session.  Salvatore " demonstrated good  understanding of the HEP provided.     See EMR under Patient Instructions for exercises provided 2/19/2019     Assessment   Pt participated well this date. His pain report remains low throughout sessions. He tolerated progression of treatment well. Able to increase weight and resistance without c/o today as well as with good technique. Pt very motivated.     Salvatore is progressing well towards his goals and there are no updates to goals at this time. Pt prognosis is Good.     Pt will continue to benefit from skilled outpatient occupational therapy to address the deficits listed in the problem list on initial evaluation provide pt/family education and to maximize pt's level of independence in the home and community environment.     Anticipated barriers to occupational therapy: none    Pt's spiritual, cultural and educational needs considered and pt agreeable to plan of care and goals.    Goals:    Short Term Goals to be met in 4 weeks: (3/19/2019)  1) Initiate Hep-not met, progressing  2) Pt will increase R shoulder AROM by 10 degrees grossly for improved performance with overhead ADL's-not met, progressing  3) Pt will report 5/10 pain in (R)shoulder at worst-not met, progressing  4) Pt will demonstrate increased MMT to 4+/5 grossly R shoulder-not met, progressing  5) Patient will be able to achieve less than or equal to 15% on FOTO shoulder survey demonstrating overall improved functional ability with upper extremity-not met, progressing .      Long Term Goals to be met by discharge:  1) Independent with HEP  2) Pt will demonstrate (R) shoulder AROM WNL grossly for [place] with ADL's  3) Pt will demonstrate (R) shoulder MMT WNL grossly for [place] with functional activities  4) Independent and pain free with ADL's and IADL's  5) Patient will be able to achieve less than or equal to 0% on FOTO shoulder survey demonstrating overall improved functional ability with upper extremity.     Plan    Cont with OT POC  Updates/Grading for next session: progress with strengthening      TAWANA Wong

## 2019-03-19 ENCOUNTER — CLINICAL SUPPORT (OUTPATIENT)
Dept: REHABILITATION | Facility: HOSPITAL | Age: 24
End: 2019-03-19
Payer: COMMERCIAL

## 2019-03-19 DIAGNOSIS — R53.1 WEAKNESS: ICD-10-CM

## 2019-03-19 DIAGNOSIS — M79.601 PAIN OF RIGHT UPPER EXTREMITY: ICD-10-CM

## 2019-03-19 PROCEDURE — 97110 THERAPEUTIC EXERCISES: CPT | Mod: PN

## 2019-03-19 NOTE — PROGRESS NOTES
"  Occupational Therapy Daily Treatment Note     Date: 3/19/2019  Name: Salvatore Bobby  Clinic Number: [id number]    Therapy Diagnosis:   Encounter Diagnoses   Name Primary?    Pain of right upper extremity     Weakness      Physician: Macarena Alberto MD    Physician Orders: OT eval and treat  Medical Diagnosis: M25.511 (ICD-10-CM) - Acute pain of right shoulder  Evaluation Date: 2/19/2019  Insurance Authorization period Expiration: 12/31/2019  Plan of Care Expiration Period: 4/19/2019     Visit # / Visits Authortized: 7 / 60  Time In:705  Time Out: 805  Total Billable Time: 50 minutes  TE3    Precautions: Standard    Subjective     Pt reports: "I feel like I have more power with my volleyball serve."  he was compliant with home exercise program given last session.   Response to previous treatment:decreased pain, increased strength   Functional change:Pt able to serve overhand pain free    Pain: 0/10  Location: right shoulder      Objective     Pt on UBE for/rev @60 rpms x 6 minutes prior to session    Salvatore received therapeutic exercises for 50 minutes including:  Exercises        Supine pectoralis stretch 3/30"   Supine butterfly stretch 3/30"   Supine dumbell flexion 4  2/15   Supine serratus punch 4  2/15   Sidelying Abduction 4  2/15   Sidelying External Rotation 4  2/15   Sidelying Gator 4  2/15   Sleeper Stretch 3/30"       pushups Ballet bar 2/15   Wall clocks  red plyo band  2/10   Corner pectoralis stretch 3/30"   Theraband Lats blue  2/15   Theraband Rows blue  2/15   Theraband Internal Rotation/External Rotation blue  2/15   Horizontal Abduction Blue  2/15   Internal Rotation pulley stretch 3/30"   Posterior capsule stretch 3/30"   Overhand VolleyBall serve 20x  CP x 10 minutes for pain management after session  Home Exercises and Education Provided     Education provided:   - Progress towards goals     Written Home Exercises Provided: Patient instructed to cont prior HEP.  Exercises were reviewed " and Salvatore was able to demonstrate them prior to the end of the session.  Salvatore demonstrated good  understanding of the HEP provided.     See EMR under Patient Instructions for exercises provided 2/19/2019     Assessment   Pt participated well this date. He was able to serve volley ball over hand at moderate force without c/o pain. Pain free throughout today's session. Pt is very motivated. Biggest limitation seems to be posterior capsul tightness however improving.   Salvatore is progressing well towards his goals and there are no updates to goals at this time. Pt prognosis is Good.     Pt will continue to benefit from skilled outpatient occupational therapy to address the deficits listed in the problem list on initial evaluation provide pt/family education and to maximize pt's level of independence in the home and community environment.     Anticipated barriers to occupational therapy: none    Pt's spiritual, cultural and educational needs considered and pt agreeable to plan of care and goals.    Goals:    Short Term Goals to be met in 4 weeks: (3/19/2019)  1) Initiate Hep-met  2) Pt will increase R shoulder AROM by 10 degrees grossly for improved performance with overhead ADL's-met  3) Pt will report 5/10 pain in (R)shoulder at worst-met  4) Pt will demonstrate increased MMT to 4+/5 grossly R shoulder-met  5) Patient will be able to achieve less than or equal to 15% on FOTO shoulder survey demonstrating overall improved functional ability with upper extremity-not met, progressing .      Long Term Goals to be met by discharge:  1) Independent with HEP-ongoing  2) Pt will demonstrate (R) shoulder AROM WNL grossly for [place] with ADL's-ongoing  3) Pt will demonstrate (R) shoulder MMT WNL grossly for [place] with functional activities-ongoing  4) Independent and pain free with ADL's and IADL's-ongoing  5) Patient will be able to achieve less than or equal to 0% on FOTO shoulder survey demonstrating overall  improved functional ability with upper extremity-not met.     Plan   Cont with OT POC  Updates/Grading for next session: progress with strengthening, volleyball serve      TAWANA Wong

## 2019-03-21 ENCOUNTER — CLINICAL SUPPORT (OUTPATIENT)
Dept: REHABILITATION | Facility: HOSPITAL | Age: 24
End: 2019-03-21
Payer: COMMERCIAL

## 2019-03-21 DIAGNOSIS — R53.1 WEAKNESS: ICD-10-CM

## 2019-03-21 DIAGNOSIS — M79.601 PAIN OF RIGHT UPPER EXTREMITY: ICD-10-CM

## 2019-03-21 PROCEDURE — 97110 THERAPEUTIC EXERCISES: CPT | Mod: PN

## 2019-03-21 NOTE — PROGRESS NOTES
"  Occupational Therapy Daily Treatment Note     Date: 3/21/2019  Name: Salvatore Bobby  Clinic Number: [id number]    Therapy Diagnosis:   Encounter Diagnoses   Name Primary?    Pain of right upper extremity     Weakness      Physician: Macarena Alberto MD    Physician Orders: OT eval and treat  Medical Diagnosis: M25.511 (ICD-10-CM) - Acute pain of right shoulder  Evaluation Date: 2/19/2019  Insurance Authorization period Expiration: 12/31/2019  Plan of Care Expiration Period: 4/19/2019     Visit # / Visits Authortized: 8 / 60  Time In:705  Time Out: 805  Total Billable Time: 50 minutes  TE3    Precautions: Standard    Subjective     Pt reports: "I got dead legged playing soccer so my leg is killing me, my shoulder feels great though."  he was compliant with home exercise program given last session.   Response to previous treatment:decreased pain, increased strength   Functional change:Pt able to serve overhand pain free, able to increase weight with exercises.    Pain: 0/10  Location: right shoulder      Objective     Pt on UBE for/rev @60 rpms x 6 minutes prior to session    Salvatore received therapeutic exercises for 50 minutes including:  Exercises        Supine pectoralis stretch 3/30"   Supine butterfly stretch 3/30"   Supine dumbell flexion 4  2/15   Supine serratus punch 4  2/15   Sidelying Abduction 5  2/15   Sidelying External Rotation 5  2/15   Sidelying Gator 5  2/15   Sleeper Stretch 3/30"       pushups Not today   Wall clocks  Green plyo band  2/10   Corner pectoralis stretch 3/30"   Theraband Lats black  2/15   Theraband Rows Black  2/15   Theraband Internal Rotation/External Rotation Black  2/15   Horizontal Abduction Black  2/15   Internal Rotation pulley stretch 3/30"   Posterior capsule stretch 3/30"     Overhand VolleyBall serve 20x  CP x 10 minutes for pain management after session  Home Exercises and Education Provided     Education provided:   - Progress towards goals     Written Home " Exercises Provided: Patient instructed to cont prior HEP.  Exercises were reviewed and Salvatore was able to demonstrate them prior to the end of the session.  Salvatore demonstrated good  understanding of the HEP provided.     See EMR under Patient Instructions for exercises provided 2/19/2019     Assessment   Pt continues to be motivated in sessions. He was able to increase weight and resistance with all exercises today without c/o as well as with good technique. Able to serve with increased force today without any pain. Biggest limitation seems to be posterior capsul tightness however improving.   Salvatore is progressing well towards his goals and there are no updates to goals at this time. Pt prognosis is Good.     Pt will continue to benefit from skilled outpatient occupational therapy to address the deficits listed in the problem list on initial evaluation provide pt/family education and to maximize pt's level of independence in the home and community environment.     Anticipated barriers to occupational therapy: none    Pt's spiritual, cultural and educational needs considered and pt agreeable to plan of care and goals.    Goals:    Short Term Goals to be met in 4 weeks: (3/19/2019)  1) Initiate Hep-met  2) Pt will increase R shoulder AROM by 10 degrees grossly for improved performance with overhead ADL's-met  3) Pt will report 5/10 pain in (R)shoulder at worst-met  4) Pt will demonstrate increased MMT to 4+/5 grossly R shoulder-met  5) Patient will be able to achieve less than or equal to 15% on FOTO shoulder survey demonstrating overall improved functional ability with upper extremity-not met, progressing .      Long Term Goals to be met by discharge:  1) Independent with HEP-ongoing  2) Pt will demonstrate (R) shoulder AROM WNL grossly for [place] with ADL's-ongoing  3) Pt will demonstrate (R) shoulder MMT WNL grossly for [place] with functional activities-ongoing  4) Independent and pain free with ADL's  and IADL's-ongoing  5) Patient will be able to achieve less than or equal to 0% on FOTO shoulder survey demonstrating overall improved functional ability with upper extremity-not met.     Plan   Cont with OT POC  Updates/Grading for next session: progress with strengthening, volleyball serve, anticipate discharge to HEP next week      TAWANA Wong

## 2019-03-26 ENCOUNTER — CLINICAL SUPPORT (OUTPATIENT)
Dept: REHABILITATION | Facility: HOSPITAL | Age: 24
End: 2019-03-26
Payer: COMMERCIAL

## 2019-03-26 DIAGNOSIS — M79.601 PAIN OF RIGHT UPPER EXTREMITY: ICD-10-CM

## 2019-03-26 DIAGNOSIS — R53.1 WEAKNESS: ICD-10-CM

## 2019-03-26 PROCEDURE — 97110 THERAPEUTIC EXERCISES: CPT | Mod: PN

## 2019-03-26 NOTE — PROGRESS NOTES
"  Occupational Therapy Daily Treatment Note/Dicharge Summary     Date: 3/26/2019  Name: Salvatore Bobby  Clinic Number: [id number]    Therapy Diagnosis:   Encounter Diagnoses   Name Primary?    Pain of right upper extremity     Weakness      Physician: Macarena Alberto MD    Physician Orders: OT eval and treat  Medical Diagnosis: M25.511 (ICD-10-CM) - Acute pain of right shoulder  Evaluation Date: 2/19/2019  Insurance Authorization period Expiration: 12/31/2019  Plan of Care Expiration Period: 4/19/2019     Visit # / Visits Authortized: 9/ 60 FOTO:22%  Time In:705  Time Out: 805  Total Billable Time: 50 minutes  TE3    Precautions: Standard    Subjective     Pt reports: "I feel like my shoulder has increased ROM and it doesn't hurt as much as it used to."  he was compliant with home exercise program given at initial evaluation   Response to previous treatment:decreased pain, increased strength   Functional change:Pt able to serve overhand pain free, able to increase weight with exercises.    Pain: 0/10  Location: right shoulder      Objective     Pt on UBE for/rev @60 rpms x 6 minutes prior to session    Salvatore received therapeutic exercises for 50 minutes including:  Exercises        Supine pectoralis stretch 3/30"   Supine butterfly stretch 3/30"   Supine dumbell flexion 5  2/15   Supine serratus punch 5  2/15   Sidelying Abduction 5  2/15   Sidelying External Rotation 5  2/15   Sidelying Gator 5  2/15   Sleeper Stretch 3/30"       pushups 2/15   Wall clocks  Green plyo band  2/10   Corner pectoralis stretch 3/30"   Theraband Lats black  2/15   Theraband Rows Black  2/15   Theraband Internal Rotation/External Rotation Black  2/15   Horizontal Abduction Black  2/15   Internal Rotation pulley stretch 3/30"   Posterior capsule stretch 3/30"     Range of Motion/Strength:   Shoulder   Right   Pain/Dysfunction with Movement     AROM PROM MMT     Flexion 160(+10) WNL 4/5     Extension 55(+10) WNL 5/5     Abduction " 160(+30) WNL 4-/5     HorizAdduction 60(+20) WNL 4/5     Internal rotation T12(+L4) WNL 4/5     ER at 90° abd 90(=) WL 4/5     ER at 0° abd 90(=) WNL 4/5       CP x 10 minutes for pain management after session  Home Exercises and Education Provided     Education provided:   - Progress towards goals     Written Home Exercises Provided: Patient instructed to cont prior HEP.  Exercises were reviewed and Salvatore was able to demonstrate them prior to the end of the session.  Salvatore demonstrated good  understanding of the HEP provided.     See EMR under Patient Instructions for exercises provided 2/19/2019     Assessment   Pt has been attending OT x 5 weeks for treatment of acute onset of Right shoulder pain. Pt has been motivated and complaint throughout course of care. He is demonstrating overall improved ROM and MMT in Right shoulder. Both are WNL grossly. He is Independent and pain free with ADL's, IADL's, including work tasks, HEP and volleyball. He has a improved FOTO score indicating increased functional use Right shoulder with self care tasks. Pt has made excellent progress towards goals. The only goals not met were his FOTO goals. Pt no longer requires skilled OT at this time. Plan to discharge to [facility].     Anticipated barriers to occupational therapy: none    Pt's spiritual, cultural and educational needs considered and pt agreeable to plan of care and goals.    Goals:    Short Term Goals to be met in 4 weeks: (3/19/2019)  1) Initiate Hep-met  2) Pt will increase R shoulder AROM by 10 degrees grossly for improved performance with overhead ADL's-met  3) Pt will report 5/10 pain in (R)shoulder at worst-met  4) Pt will demonstrate increased MMT to 4+/5 grossly R shoulder-met  5) Patient will be able to achieve less than or equal to 15% on FOTO shoulder survey demonstrating overall improved functional ability with upper extremity-not met     Long Term Goals to be met by discharge:  1) Independent with HEP-met  2) Pt  will demonstrate (R) shoulder AROM WNL grossly for [place] with ADL's-met  3) Pt will demonstrate (R) shoulder MMT WNL grossly for [place] with functional activities-omet  4) Independent and pain free with ADL's and IADL's-met  5) Patient will be able to achieve less than or equal to 0% on FOTO shoulder survey demonstrating overall improved functional ability with upper extremity-not met.     Plan   Discharge from OT    TAWANA Wong

## 2019-03-28 ENCOUNTER — HOSPITAL ENCOUNTER (OUTPATIENT)
Dept: RADIOLOGY | Facility: HOSPITAL | Age: 24
Discharge: HOME | End: 2019-03-28
Attending: FAMILY MEDICINE
Payer: COMMERCIAL

## 2019-03-28 ENCOUNTER — OFFICE VISIT (OUTPATIENT)
Dept: FAMILY MEDICINE | Facility: CLINIC | Age: 24
End: 2019-03-28
Attending: FAMILY MEDICINE
Payer: COMMERCIAL

## 2019-03-28 VITALS
HEIGHT: 73 IN | HEART RATE: 83 BPM | DIASTOLIC BLOOD PRESSURE: 68 MMHG | BODY MASS INDEX: 23.64 KG/M2 | WEIGHT: 178.38 LBS | SYSTOLIC BLOOD PRESSURE: 98 MMHG | OXYGEN SATURATION: 98 %

## 2019-03-28 DIAGNOSIS — S89.91XA INJURY OF RIGHT LOWER EXTREMITY, INITIAL ENCOUNTER: ICD-10-CM

## 2019-03-28 DIAGNOSIS — M79.604 RIGHT LEG PAIN: ICD-10-CM

## 2019-03-28 DIAGNOSIS — S89.91XA INJURY OF RIGHT LOWER EXTREMITY, INITIAL ENCOUNTER: Primary | ICD-10-CM

## 2019-03-28 PROCEDURE — 99214 PR OFFICE/OUTPT VISIT, EST, LEVL IV, 30-39 MIN: ICD-10-PCS | Mod: S$GLB,,, | Performed by: FAMILY MEDICINE

## 2019-03-28 PROCEDURE — 99999 PR PBB SHADOW E&M-EST. PATIENT-LVL IV: CPT | Mod: PBBFAC,,, | Performed by: FAMILY MEDICINE

## 2019-03-28 PROCEDURE — 73552 XR FEMUR 2 VIEW RIGHT: ICD-10-PCS | Mod: 26,RT,, | Performed by: RADIOLOGY

## 2019-03-28 PROCEDURE — 3008F PR BODY MASS INDEX (BMI) DOCUMENTED: ICD-10-PCS | Mod: CPTII,S$GLB,, | Performed by: FAMILY MEDICINE

## 2019-03-28 PROCEDURE — 73552 X-RAY EXAM OF FEMUR 2/>: CPT | Mod: TC,FY,RT

## 2019-03-28 PROCEDURE — 99999 PR PBB SHADOW E&M-EST. PATIENT-LVL IV: ICD-10-PCS | Mod: PBBFAC,,, | Performed by: FAMILY MEDICINE

## 2019-03-28 PROCEDURE — 3008F BODY MASS INDEX DOCD: CPT | Mod: CPTII,S$GLB,, | Performed by: FAMILY MEDICINE

## 2019-03-28 PROCEDURE — 73552 X-RAY EXAM OF FEMUR 2/>: CPT | Mod: 26,RT,, | Performed by: RADIOLOGY

## 2019-03-28 PROCEDURE — 99214 OFFICE O/P EST MOD 30 MIN: CPT | Mod: S$GLB,,, | Performed by: FAMILY MEDICINE

## 2019-03-28 RX ORDER — DICLOFENAC SODIUM 75 MG/1
75 TABLET, DELAYED RELEASE ORAL 2 TIMES DAILY PRN
Qty: 60 TABLET | Refills: 3 | Status: SHIPPED | OUTPATIENT
Start: 2019-03-28 | End: 2023-04-25

## 2019-03-28 NOTE — PROGRESS NOTES
Subjective:       Patient ID: Salvatore Bobby is a 24 y.o. male.    Chief Complaint: Leg Pain (Right Leg)    24 yr old pleasant white male with no significant medical history presents today as new patient to me and for evaluation of right leg pain. Onset 2 weeks ago after he played soccer/football and made a jump for the ball and had injury to right leg. He had swelling right leg and pain. He did cool packs and it helped some. He is still having lingering pain. He tried tylenol with no relief. He did not tried NSAIDS yet. Details as follows -      History as below - reviewed    Leg Pain    The injury mechanism was a direct blow and a twisting injury. The pain is present in the right thigh and right leg. The quality of the pain is described as aching and cramping. The pain is at a severity of 5/10. The pain is moderate. The pain has been constant since onset. Pertinent negatives include no inability to bear weight, loss of motion, loss of sensation, muscle weakness, numbness or tingling. The symptoms are aggravated by movement and palpation. He has tried NSAIDs for the symptoms. The treatment provided mild relief.     Review of Systems   Constitutional: Negative.  Negative for activity change, diaphoresis and unexpected weight change.   HENT: Negative.  Negative for congestion, ear pain, mouth sores, rhinorrhea and voice change.    Eyes: Negative.  Negative for pain, discharge and visual disturbance.   Respiratory: Negative.  Negative for apnea, cough and wheezing.    Cardiovascular: Negative.  Negative for chest pain and palpitations.   Gastrointestinal: Negative.  Negative for abdominal distention, anal bleeding, diarrhea and vomiting.   Endocrine: Negative.  Negative for cold intolerance and polyuria.   Genitourinary: Negative.  Negative for decreased urine volume, difficulty urinating, discharge, frequency and scrotal swelling.   Musculoskeletal: Positive for myalgias. Negative for back pain and neck stiffness.    Skin: Negative.  Negative for color change and rash.   Allergic/Immunologic: Negative.  Negative for environmental allergies and immunocompromised state.   Neurological: Negative.  Negative for dizziness, tingling, speech difficulty, weakness, light-headedness and numbness.   Hematological: Negative.    Psychiatric/Behavioral: Negative.  Negative for agitation, dysphoric mood and suicidal ideas. The patient is not nervous/anxious.        PMH/PSH/FH/SH/MED/ALLERGY reviewed    Objective:       Vitals:    03/28/19 1113   BP: 98/68   Pulse: 83       Physical Exam   Constitutional: He is oriented to person, place, and time. He appears well-developed and well-nourished.   HENT:   Head: Normocephalic and atraumatic.   Right Ear: External ear normal.   Left Ear: External ear normal.   Nose: Nose normal.   Mouth/Throat: Oropharynx is clear and moist. No oropharyngeal exudate.   Eyes: Pupils are equal, round, and reactive to light. Conjunctivae and EOM are normal. Right eye exhibits no discharge. Left eye exhibits no discharge. No scleral icterus.   Neck: Normal range of motion. Neck supple. No JVD present. No tracheal deviation present. No thyromegaly present.   Cardiovascular: Normal rate, regular rhythm, normal heart sounds and intact distal pulses. Exam reveals no gallop and no friction rub.   No murmur heard.  Pulmonary/Chest: Effort normal and breath sounds normal. No stridor. No respiratory distress. He has no wheezes. He has no rales. He exhibits no tenderness.   Abdominal: Soft. Bowel sounds are normal. He exhibits no distension and no mass. There is no tenderness. There is no rebound and no guarding. No hernia.   Musculoskeletal: Normal range of motion. He exhibits tenderness (TTP RIGHT THIGH/UPPER LEG. NO MASS. NO RESTRICTION ROM.). He exhibits no edema.   Lymphadenopathy:     He has no cervical adenopathy.   Neurological: He is alert and oriented to person, place, and time. He has normal reflexes. He displays  normal reflexes. No cranial nerve deficit. He exhibits normal muscle tone. Coordination normal.   Skin: Skin is warm and dry. No rash noted. No erythema. No pallor.   Psychiatric: He has a normal mood and affect. His behavior is normal. Judgment and thought content normal.       X-Ray Femur 2 View Right 03/28/2019 None Specified             RESULTS:     EXAMINATION:  XR FEMUR 2 VIEW RIGHT     CLINICAL HISTORY:  Unspecified injury of right lower leg, initial encounter     TECHNIQUE:  AP and lateral views of the right femur were performed.     COMPARISON:  Right tibia 2012     FINDINGS:  Bone joint soft tissues normal.     IMPRESSION:      No fracture dislocation.         Assessment:       1. Injury of right lower extremity, initial encounter    2. Right leg pain        Plan:           Salvatore GUO was seen today for leg pain.    Diagnoses and all orders for this visit:    Injury of right lower extremity, initial encounter  -     X-Ray Femur 2 View Right; Future  -     diclofenac (VOLTAREN) 75 MG EC tablet; Take 1 tablet (75 mg total) by mouth 2 (two) times daily as needed.    Right leg pain  -     X-Ray Femur 2 View Right; Future  -     diclofenac (VOLTAREN) 75 MG EC tablet; Take 1 tablet (75 mg total) by mouth 2 (two) times daily as needed.      RIGHT LEG INJURY/RIGHT THIGH PAIN  -MYALGIA  -X RAY R/O BONE ETIOLOGY  -VOLTAREN PO AS NEEDED  -ICE PACKS  -MAY NEED ORTHO FOR PERSISTING PAIN    Spent adequate time in obtaining history and explaining differentials    40 minutes spent during this visit of which greater than 50% devoted to face-face counseling and coordination of care regarding diagnosis and management plan    Follow up if symptoms worsen or fail to improve.

## 2019-03-28 NOTE — PATIENT INSTRUCTIONS
Myalgias  Myalgias are another word for muscle aches and soreness. This is a symptom, not a disease. Myalgias can have many causes. A cold, the flu, or an acute infection can cause them. So can any illness with a high fever. They may happen after exertion (such as heavy exercise) or injury (such as an accident or fall). Some medicines (such as statins and certain antidepressants) can cause myalgias. They can also be a symptom of chronic or ongoing medical problems (such as lupus, chronic fatigue, or hypothyroidism). With these illnesses, other serious symptoms often occur in addition to muscle pain and soreness.    Myalgias most often go away on their own. If they don't go away, come back, or are severe, testing may be needed to help find the cause.  Home care  · Rest until you feel better.  · Follow instructions that you were given for how to care for yourself. This may depend on the cause of your myalgias.   · If myalgia is thought to be due to a medicine, be sure to talk to the doctor that prescribed the medicine about the best course of action.  · To control pain, take prescription or over-the-counter medicines as directed. Unless told not to, you can try acetaminophen or ibuprofen.  Follow-up care  Follow up with your healthcare provider or as advised. If your symptoms do not go away in a few days or if they come back, follow up with your healthcare provider for an exam and testing.  When to see medical advice  Call your healthcare provider for any of the following:  · Fever of 100.4°F (38ºC) or higher, or as directed by your healthcare provider  · Pain that gets worse and not better, or that goes away and comes back  · New joint pains  · New rash  · Severe headache, neck pain, drowsiness, or confusion  Date Last Reviewed: 3/1/2017  © 4135-4435 [employer]. [address] [place] [address], [place] [address]. All rights reserved. This information is not intended as a substitute for professional medical  care. Always follow your healthcare professional's instructions.        ACE Wrap  Minor muscle or joint injuries are often treated with an elastic bandage. The bandage provides support and compression to the injured area. An elastic bandage is a stretchy, rolled bandage. Elastic bandages range in width from 2 to 6 inches. They can be used for a variety of injuries. The bandages are often called ACE bandages, after the most common brand name.  If used correctly, elastic bandages help control swelling and ease pain. An elastic bandage is also a good reminder not to overuse the injured area. However, elastic bandages do not provide a lot of support and will not prevent reinjury.  Home care    To apply an elastic bandage:  · Check the skin before wrapping the injury. It should be clean, dry, and free of drainage.  · Start wrapping below the injury and work your way toward the body. For an ankle sprain, start wrapping around the foot and work up toward the calf. This will help control swelling.  · Overlap the edges of the bandage so it stays snuggly in place.  · Wrap the bandage firmly, but not too tightly. A tight bandage can increase swelling on either end of the bandage. Make sure the bandage is wrinkle free.  · Leave fingers and toes exposed.  · Secure ends of the bandage (even self-sticking ones) with clips or tape.  · Check frequently to ensure adequate circulation, especially in the fingers and toes. Loosen the bandage if there is local swelling, numbness, tingling, discomfort, coldness, or discoloration (skin pale or bluish in color).  · Rewrap the bandage as needed during the day. Reroll the bandage as you unwind it.  Continue using the elastic bandage until the pain and swelling are gone or as your healthcare provider advises.  If you have been told to ice the area, the ice can be secured in place with the elastic bandage. Wrap the ice pack with a thin towel to protect the skin. Do not put ice or an ice pack  directly on the skin.  Ice the area for no more than 20 minutes at a time.    Follow-up care  Follow up with your healthcare provider, as advised.  When to seek medical advice  Call your healthcare provider for any of the following:  · Pain and swelling that doesn't get better or gets worse  · Trouble moving injured area  · Skin discoloration, numbness, or tingling that doesnt go away after bandage is removed  Date Last Reviewed: 9/13/2015 © 2000-2017 [employer]. [address] [place] [address], [place] [address]. All rights reserved. This information is not intended as a substitute for professional medical care. Always follow your healthcare professional's instructions.

## 2020-03-09 ENCOUNTER — PATIENT OUTREACH (OUTPATIENT)
Dept: ADMINISTRATIVE | Facility: HOSPITAL | Age: 25
End: 2020-03-09

## 2020-10-05 ENCOUNTER — PATIENT MESSAGE (OUTPATIENT)
Dept: ADMINISTRATIVE | Facility: HOSPITAL | Age: 25
End: 2020-10-05

## 2020-12-13 ENCOUNTER — OFFICE VISIT (OUTPATIENT)
Dept: URGENT CARE | Facility: CLINIC | Age: 25
End: 2020-12-13
Payer: COMMERCIAL

## 2020-12-13 VITALS
OXYGEN SATURATION: 100 % | WEIGHT: 178.38 LBS | RESPIRATION RATE: 20 BRPM | DIASTOLIC BLOOD PRESSURE: 78 MMHG | HEART RATE: 80 BPM | TEMPERATURE: 98 F | HEIGHT: 73 IN | BODY MASS INDEX: 23.64 KG/M2 | SYSTOLIC BLOOD PRESSURE: 131 MMHG

## 2020-12-13 DIAGNOSIS — R51.9 NONINTRACTABLE EPISODIC HEADACHE, UNSPECIFIED HEADACHE TYPE: ICD-10-CM

## 2020-12-13 DIAGNOSIS — R09.82 ALLERGIC RHINITIS WITH POSTNASAL DRIP: ICD-10-CM

## 2020-12-13 DIAGNOSIS — Z20.822 EXPOSURE TO COVID-19 VIRUS: Primary | ICD-10-CM

## 2020-12-13 DIAGNOSIS — J30.9 ALLERGIC RHINITIS WITH POSTNASAL DRIP: ICD-10-CM

## 2020-12-13 DIAGNOSIS — R05.9 COUGH: ICD-10-CM

## 2020-12-13 LAB
CTP QC/QA: YES
SARS-COV-2 RDRP RESP QL NAA+PROBE: NEGATIVE

## 2020-12-13 PROCEDURE — 99213 OFFICE O/P EST LOW 20 MIN: CPT | Mod: S$GLB,CS,, | Performed by: PHYSICIAN ASSISTANT

## 2020-12-13 PROCEDURE — U0002 COVID-19 LAB TEST NON-CDC: HCPCS | Mod: QW,S$GLB,, | Performed by: PHYSICIAN ASSISTANT

## 2020-12-13 PROCEDURE — 99213 PR OFFICE/OUTPT VISIT, EST, LEVL III, 20-29 MIN: ICD-10-PCS | Mod: S$GLB,CS,, | Performed by: PHYSICIAN ASSISTANT

## 2020-12-13 PROCEDURE — U0002: ICD-10-PCS | Mod: QW,S$GLB,, | Performed by: PHYSICIAN ASSISTANT

## 2020-12-13 PROCEDURE — 3008F PR BODY MASS INDEX (BMI) DOCUMENTED: ICD-10-PCS | Mod: CPTII,S$GLB,, | Performed by: PHYSICIAN ASSISTANT

## 2020-12-13 PROCEDURE — 3008F BODY MASS INDEX DOCD: CPT | Mod: CPTII,S$GLB,, | Performed by: PHYSICIAN ASSISTANT

## 2020-12-13 NOTE — PATIENT INSTRUCTIONS
"NEGATIVE COVID TEST  You have tested negative for COVID-19 today.  If you did not have a close exposure (as defined below) you can return to your normal daily activities to include social distancing, wearing a mask and frequent handwashing.  A "close exposure" is defined as anyone who has had an exposure (masked or unmasked) to a known COVID -19 positive person within 6 ft for longer than 15 minutes. If your exposure meets this definition, you are required by CDC guidelines to quarantine for at least 7-10 days from time of exposure.  The CDC states that a test can be performed for an asymptomatic patient (someone who does not have any symptoms) after a close exposure, and that a test should be done if you develop symptoms after a close exposure as described above.  Specifically, you can test at day 5 or later if asymptomatic in order to get released from quarantine on day 7 or later.  If you develop symptoms sooner, you should test when your symptoms start.  If you developed symptoms since the exposure, and your test was negative today and less than 5 days from your exposure, you still have to quarantine for 7-10 days from the date of the exposure.  The 7-10 day quarantine begins from the day you were exposed, not the day of your test.  For example, if your exposure was on a Monday, and you waited until Friday of the same week to get tested and it was negative, your 7-10 day quarantine begins from that Monday, not the Friday you tested negative.  Please note, if you decide to test as an asymptomatic during your quarantine and you are positive, you will be restarting your quarantine and moving from a possible 10 day quarantine (if you do not test), to a 11 day or greater quarantine.    CDC Testing and Quarantine Guidelines for patients with exposure to a known-positive COVID-19 person:      A close exposure is defined as anyone who has had an exposure (masked or unmasked) to a known COVID -19 positive person within " 6 ft for longer than 15 minutes. If your exposure meets this definition you are required by CDC guidelines to quarantine for at least 7-10 days from time of exposure. The CDC states that a test can be performed for an asymptomatic patient (someone who does not have any symptoms) after a close exposure, and that a test should be done if you develop symptoms after a close exposure as described above.  Specifically, you can test at day 5 or later if asymptomatic, in order to get released from quarantine on day 7 or later.  If you develop symptoms sooner, you should test when your symptoms start.  If you meet the definition of a close exposure, it will not matter whether you are experiencing symptoms- a quarantine for at least 7-10 days after a close exposure is required by CDC guidelines.  Please note, if you decide to test as an asymptomatic during your quarantine and you are positive, you will be restarting your quarantine and moving from a possible 10 day quarantine (if you do not test), to a 11 day or greater quarantine.  The CDC also suggests people still monitor for symptoms for a full 14 days and remember that the shorter quarantine options do not replace initial CDC guidance.  The CDC continues to recommend quarantining for 14 days as the best way to reduce risk for spreading COVID-19 - however, this is only a recommendation.  If your exposure does not meet the above definition, you can return to your normal daily activities to include social distancing, wearing a mask and frequent handwashing.            - Rest.    - Drink plenty of fluids.      - Viral upper respiratory infections typically run their course in 10-14 days.     - Tylenol or Ibuprofen as directed as needed for fever/pain. Avoid tylenol if you have a history of liver disease. Do not take ibuprofen if you have a history of GI bleeding, kidney disease, or if you take blood thinners.     - You can take over-the-counter claritin, zyrtec, allegra, or  xyzal as directed. These are antihistamines that can help with runny nose, nasal congestion, sneezing, and helps to dry up post-nasal drip, which usually causes sore throat and cough.   - If you do NOT have high blood pressure, you may use a decongestant form (D)  of this medication or if you do not take the D form, you can take sudafed  (pseudoephedrine) over the counter, which is a decongestant.    - You can use Flonase (fluticasone) nasal spray as directed for sinus congestion and postnasal drip. This is a steroid nasal spray that works locally over time to decrease the inflammation in your nose/sinuses and help with allergic symptoms. This is not an quick- relief spray like afrin, but it works well if used daily.  Discontinue if you develop nose bleed  - use nasal saline prior to Flonase.    - Use Ocean Spray Nasal Saline 1-3 puffs each nostril every 2-3 hours then blow out onto tissue. This is to irrigate the nasal passage way to clear the sinus openings. Use until sinus problem resolved.      - you can take plain Mucinex (guaifenesin) 1200 mg twice a day to help loosen mucous    -warm salt water gargles can help with sore throat    - warm tea with honey can help with cough. Honey is a natural cough suppressant.      - Follow up with your PCP or specialty clinic as directed in the next 1-2 weeks if not improved or as needed.  You can call [phone] to schedule an appointment with the appropriate provider.      - Go to the ER if you develop new or worsening symptoms.     - You must understand that you have received an Urgent Care treatment only and that you may be released before all of your medical problems are known or treated.   - You, the patient, will arrange for follow up care as instructed.   - If your condition worsens or fails to improve we recommend that you receive another evaluation at the ER immediately or contact your PCP to discuss your concerns or return here.

## 2020-12-13 NOTE — PROGRESS NOTES
"Subjective:       Patient ID: Salvatore Bobby is a 25 y.o. male.    Vitals:  height is 6' 1" (1.854 m) and weight is 80.9 kg (178 lb 5.6 oz). His temperature is 98.1 °F (36.7 °C). His blood pressure is 131/78 and his pulse is 80. His respiration is 20 and oxygen saturation is 100%.     Chief Complaint: COVID-19 Concerns    Patient here today with chief complaint of mild headaches intermittently.  He states that he did just finish with final exams, so thinks that this is likely the cause.  Only other associated symptoms is intermittent congestion, postnasal drip, and mild infrequent cough every once in a while.  He states that he came in today to be tested for COVID because his parents both tested positive for COVID, and he lives with them.  Denies fever or difficulty breathing.      Constitution: Negative for chills, sweating, fatigue and fever.   HENT: Positive for congestion and postnasal drip. Negative for sore throat.    Neck: Negative for painful lymph nodes.   Cardiovascular: Negative for chest pain, leg swelling and sob on exertion.   Eyes: Negative for double vision and blurred vision.   Respiratory: Positive for cough. Negative for sputum production and shortness of breath.    Gastrointestinal: Negative for nausea, vomiting and diarrhea.   Genitourinary: Negative for dysuria, frequency, urgency and flank pain.   Musculoskeletal: Negative for joint pain, joint swelling, muscle cramps and muscle ache.   Skin: Negative for color change, pale and rash.   Allergic/Immunologic: Positive for environmental allergies and seasonal allergies.   Neurological: Positive for headaches. Negative for dizziness, history of vertigo, light-headedness, passing out, altered mental status and loss of consciousness.   Hematologic/Lymphatic: Negative for swollen lymph nodes, easy bruising/bleeding and history of blood clots. Does not bruise/bleed easily.   Psychiatric/Behavioral: Negative for altered mental status, nervous/anxious, " sleep disturbance and depression. The patient is not nervous/anxious.        Objective:      Physical Exam   Constitutional: He is oriented to person, place, and time. He appears well-developed. He is cooperative.  Non-toxic appearance. He does not appear ill. No distress.   HENT:   Head: Normocephalic and atraumatic.   Ears:   Right Ear: Hearing, tympanic membrane, external ear and ear canal normal.   Left Ear: Hearing, tympanic membrane, external ear and ear canal normal.   Nose: Nose normal. No mucosal edema, rhinorrhea, purulent discharge or nasal deformity. No epistaxis. Right sinus exhibits no maxillary sinus tenderness and no frontal sinus tenderness. Left sinus exhibits no maxillary sinus tenderness and no frontal sinus tenderness.   Mouth/Throat: Uvula is midline, oropharynx is clear and moist and mucous membranes are normal. He does not have dentures. No trismus in the jaw. Normal dentition. No uvula swelling or dental caries. No oropharyngeal exudate, posterior oropharyngeal edema, posterior oropharyngeal erythema, tonsillar abscesses or cobblestoning. Tonsils are 1+ on the right. Tonsils are 1+ on the left. No tonsillar exudate.   Eyes: Conjunctivae and lids are normal. No scleral icterus.   Neck: Trachea normal, full passive range of motion without pain and phonation normal. Neck supple. No neck rigidity. No edema and no erythema present.   Cardiovascular: Normal rate, regular rhythm, normal heart sounds and normal pulses.   Pulmonary/Chest: Effort normal and breath sounds normal. No accessory muscle usage or stridor. No tachypnea and no bradypnea. No respiratory distress. He has no decreased breath sounds. He has no wheezes. He has no rhonchi. He has no rales.   Abdominal: Normal appearance.   Musculoskeletal: Normal range of motion.         General: No deformity.   Lymphadenopathy:        Head (right side): No submandibular, no preauricular and no posterior auricular adenopathy present.        Head  "(left side): No submandibular, no preauricular and no posterior auricular adenopathy present.     He has no cervical adenopathy.   Neurological: He is alert and oriented to person, place, and time. He exhibits normal muscle tone. Coordination normal.   Skin: Skin is warm, dry, intact, not diaphoretic and not pale. Psychiatric: His speech is normal and behavior is normal. Judgment and thought content normal.   Nursing note and vitals reviewed.      Results for orders placed or performed in visit on 12/13/20   POCT COVID-19 Rapid Screening   Result Value Ref Range    POC Rapid COVID Negative Negative     Acceptable Yes          - counseled patient and answered questions in regards to COVID-19 testing and quarantine    Assessment:       1. Exposure to COVID-19 virus    2. Cough    3. Allergic rhinitis with postnasal drip    4. Nonintractable episodic headache, unspecified headache type        Plan:         Exposure to COVID-19 virus    Cough  -     POCT COVID-19 Rapid Screening    Allergic rhinitis with postnasal drip    Nonintractable episodic headache, unspecified headache type      Patient Instructions   NEGATIVE COVID TEST  You have tested negative for COVID-19 today.  If you did not have a close exposure (as defined below) you can return to your normal daily activities to include social distancing, wearing a mask and frequent handwashing.  A "close exposure" is defined as anyone who has had an exposure (masked or unmasked) to a known COVID -19 positive person within 6 ft for longer than 15 minutes. If your exposure meets this definition, you are required by CDC guidelines to quarantine for at least 7-10 days from time of exposure.  The CDC states that a test can be performed for an asymptomatic patient (someone who does not have any symptoms) after a close exposure, and that a test should be done if you develop symptoms after a close exposure as described above.  Specifically, you can test at day 5 " or later if asymptomatic in order to get released from quarantine on day 7 or later.  If you develop symptoms sooner, you should test when your symptoms start.  If you developed symptoms since the exposure, and your test was negative today and less than 5 days from your exposure, you still have to quarantine for 7-10 days from the date of the exposure.  The 7-10 day quarantine begins from the day you were exposed, not the day of your test.  For example, if your exposure was on a Monday, and you waited until Friday of the same week to get tested and it was negative, your 7-10 day quarantine begins from that Monday, not the Friday you tested negative.  Please note, if you decide to test as an asymptomatic during your quarantine and you are positive, you will be restarting your quarantine and moving from a possible 10 day quarantine (if you do not test), to a 11 day or greater quarantine.    CDC Testing and Quarantine Guidelines for patients with exposure to a known-positive COVID-19 person:      A close exposure is defined as anyone who has had an exposure (masked or unmasked) to a known COVID -19 positive person within 6 ft for longer than 15 minutes. If your exposure meets this definition you are required by CDC guidelines to quarantine for at least 7-10 days from time of exposure. The CDC states that a test can be performed for an asymptomatic patient (someone who does not have any symptoms) after a close exposure, and that a test should be done if you develop symptoms after a close exposure as described above.  Specifically, you can test at day 5 or later if asymptomatic, in order to get released from quarantine on day 7 or later.  If you develop symptoms sooner, you should test when your symptoms start.  If you meet the definition of a close exposure, it will not matter whether you are experiencing symptoms- a quarantine for at least 7-10 days after a close exposure is required by CDC guidelines.  Please note,  if you decide to test as an asymptomatic during your quarantine and you are positive, you will be restarting your quarantine and moving from a possible 10 day quarantine (if you do not test), to a 11 day or greater quarantine.  The CDC also suggests people still monitor for symptoms for a full 14 days and remember that the shorter quarantine options do not replace initial CDC guidance.  The CDC continues to recommend quarantining for 14 days as the best way to reduce risk for spreading COVID-19 - however, this is only a recommendation.  If your exposure does not meet the above definition, you can return to your normal daily activities to include social distancing, wearing a mask and frequent handwashing.            - Rest.    - Drink plenty of fluids.      - Viral upper respiratory infections typically run their course in 10-14 days.     - Tylenol or Ibuprofen as directed as needed for fever/pain. Avoid tylenol if you have a history of liver disease. Do not take ibuprofen if you have a history of GI bleeding, kidney disease, or if you take blood thinners.     - You can take over-the-counter claritin, zyrtec, allegra, or xyzal as directed. These are antihistamines that can help with runny nose, nasal congestion, sneezing, and helps to dry up post-nasal drip, which usually causes sore throat and cough.   - If you do NOT have high blood pressure, you may use a decongestant form (D)  of this medication or if you do not take the D form, you can take sudafed  (pseudoephedrine) over the counter, which is a decongestant.    - You can use Flonase (fluticasone) nasal spray as directed for sinus congestion and postnasal drip. This is a steroid nasal spray that works locally over time to decrease the inflammation in your nose/sinuses and help with allergic symptoms. This is not an quick- relief spray like afrin, but it works well if used daily.  Discontinue if you develop nose bleed  - use nasal saline prior to Flonase.    -  Use Ocean Spray Nasal Saline 1-3 puffs each nostril every 2-3 hours then blow out onto tissue. This is to irrigate the nasal passage way to clear the sinus openings. Use until sinus problem resolved.      - you can take plain Mucinex (guaifenesin) 1200 mg twice a day to help loosen mucous    -warm salt water gargles can help with sore throat    - warm tea with honey can help with cough. Honey is a natural cough suppressant.      - Follow up with your PCP or specialty clinic as directed in the next 1-2 weeks if not improved or as needed.  You can call [phone] to schedule an appointment with the appropriate provider.      - Go to the ER if you develop new or worsening symptoms.     - You must understand that you have received an Urgent Care treatment only and that you may be released before all of your medical problems are known or treated.   - You, the patient, will arrange for follow up care as instructed.   - If your condition worsens or fails to improve we recommend that you receive another evaluation at the ER immediately or contact your PCP to discuss your concerns or return here.

## 2021-01-04 ENCOUNTER — PATIENT MESSAGE (OUTPATIENT)
Dept: ADMINISTRATIVE | Facility: HOSPITAL | Age: 26
End: 2021-01-04

## 2021-01-21 ENCOUNTER — PATIENT MESSAGE (OUTPATIENT)
Dept: FAMILY MEDICINE | Facility: CLINIC | Age: 26
End: 2021-01-21

## 2021-04-12 ENCOUNTER — PATIENT MESSAGE (OUTPATIENT)
Dept: ADMINISTRATIVE | Facility: HOSPITAL | Age: 26
End: 2021-04-12

## 2021-07-06 ENCOUNTER — PATIENT MESSAGE (OUTPATIENT)
Dept: ADMINISTRATIVE | Facility: HOSPITAL | Age: 26
End: 2021-07-06

## 2021-10-04 ENCOUNTER — PATIENT MESSAGE (OUTPATIENT)
Dept: ADMINISTRATIVE | Facility: HOSPITAL | Age: 26
End: 2021-10-04

## 2022-01-10 ENCOUNTER — PATIENT MESSAGE (OUTPATIENT)
Dept: ADMINISTRATIVE | Facility: HOSPITAL | Age: 27
End: 2022-01-10
Payer: COMMERCIAL

## 2023-04-03 ENCOUNTER — PATIENT MESSAGE (OUTPATIENT)
Dept: DERMATOLOGY | Facility: CLINIC | Age: 28
End: 2023-04-03
Payer: MEDICAID

## 2023-04-25 ENCOUNTER — OFFICE VISIT (OUTPATIENT)
Dept: DERMATOLOGY | Facility: CLINIC | Age: 28
End: 2023-04-25
Payer: MEDICAID

## 2023-04-25 VITALS — HEIGHT: 73 IN | BODY MASS INDEX: 26.51 KG/M2 | WEIGHT: 200 LBS

## 2023-04-25 DIAGNOSIS — D48.5 NEOPLASM OF UNCERTAIN BEHAVIOR OF SKIN: Primary | ICD-10-CM

## 2023-04-25 PROCEDURE — 88305 TISSUE EXAM BY PATHOLOGIST: CPT | Mod: 26,,, | Performed by: DERMATOLOGY

## 2023-04-25 PROCEDURE — 88305 TISSUE EXAM BY PATHOLOGIST: ICD-10-PCS | Mod: 26,,, | Performed by: DERMATOLOGY

## 2023-04-25 PROCEDURE — 11102 TANGNTL BX SKIN SINGLE LES: CPT | Mod: PBBFAC,PO | Performed by: DERMATOLOGY

## 2023-04-25 PROCEDURE — 3008F PR BODY MASS INDEX (BMI) DOCUMENTED: ICD-10-PCS | Mod: CPTII,,, | Performed by: DERMATOLOGY

## 2023-04-25 PROCEDURE — 1159F MED LIST DOCD IN RCRD: CPT | Mod: CPTII,,, | Performed by: DERMATOLOGY

## 2023-04-25 PROCEDURE — 99499 NO LOS: ICD-10-PCS | Mod: S$PBB,,, | Performed by: DERMATOLOGY

## 2023-04-25 PROCEDURE — 1160F PR REVIEW ALL MEDS BY PRESCRIBER/CLIN PHARMACIST DOCUMENTED: ICD-10-PCS | Mod: CPTII,,, | Performed by: DERMATOLOGY

## 2023-04-25 PROCEDURE — 99499 UNLISTED E&M SERVICE: CPT | Mod: S$PBB,,, | Performed by: DERMATOLOGY

## 2023-04-25 PROCEDURE — 99213 OFFICE O/P EST LOW 20 MIN: CPT | Mod: PBBFAC,PO,25 | Performed by: DERMATOLOGY

## 2023-04-25 PROCEDURE — 99999 PR PBB SHADOW E&M-EST. PATIENT-LVL III: ICD-10-PCS | Mod: PBBFAC,,, | Performed by: DERMATOLOGY

## 2023-04-25 PROCEDURE — 99999 PR PBB SHADOW E&M-EST. PATIENT-LVL III: CPT | Mod: PBBFAC,,, | Performed by: DERMATOLOGY

## 2023-04-25 PROCEDURE — 11102 PR TANGENTIAL BIOPSY, SKIN, SINGLE LESION: ICD-10-PCS | Mod: S$PBB,,, | Performed by: DERMATOLOGY

## 2023-04-25 PROCEDURE — 1159F PR MEDICATION LIST DOCUMENTED IN MEDICAL RECORD: ICD-10-PCS | Mod: CPTII,,, | Performed by: DERMATOLOGY

## 2023-04-25 PROCEDURE — 1160F RVW MEDS BY RX/DR IN RCRD: CPT | Mod: CPTII,,, | Performed by: DERMATOLOGY

## 2023-04-25 PROCEDURE — 11102 TANGNTL BX SKIN SINGLE LES: CPT | Mod: S$PBB,,, | Performed by: DERMATOLOGY

## 2023-04-25 PROCEDURE — 88305 TISSUE EXAM BY PATHOLOGIST: CPT | Performed by: DERMATOLOGY

## 2023-04-25 PROCEDURE — 3008F BODY MASS INDEX DOCD: CPT | Mod: CPTII,,, | Performed by: DERMATOLOGY

## 2023-04-25 NOTE — PATIENT INSTRUCTIONS
Shave Biopsy Wound Care    Your doctor has performed a shave biopsy today.  A band aid and vaseline ointment has been placed over the site.  This should remain in place for 24 hours.  It is recommended that you keep the area dry for the first 24 hours.  After 24 hours, you may remove the band aid and wash the area with warm soap and water and apply Vaseline jelly.  Many patients prefer to use Neosporin or Bacitracin ointment.  This is acceptable; however, know that you can develop an allergy to this medication even if you have used it safely for years.  It is important to keep the area moist.  Letting it dry out and get air slows healing time, and will worsen the scar.  Band aid is optional after first 24 hours.      If you notice increasing redness, tenderness, pain, or yellow drainage at the biopsy site, please notify your doctor.  These are signs of an infection.    If your biopsy site is bleeding, apply firm pressure for 15 minutes straight.  Repeat for another 15 minutes, if it is still bleeding.   If the surgical site continues to bleed, then please contact your doctor.       [facility]  [facility]  [address] [place] [address], [address] [id number]  Dept: [phone]

## 2023-04-25 NOTE — PROGRESS NOTES
Subjective:      Patient ID:  Salvatore Bobby is a 28 y.o. male who presents for   Chief Complaint   Patient presents with    Growth     Bump left side of nose     New patient.    Patient here for bump left side of nose.  Patient states bump present for 6 years.  C/O bump getting larger.    Derm Hx:  Denies Phx NMSC  Denies Fhx MM    Current Outpatient Medications:   ·  diclofenac (VOLTAREN) 75 MG EC tablet, Take 1 tablet (75 mg total) by mouth 2 (two) times daily as needed. (Patient not taking: Reported on 12/13/2020), Disp: 60 tablet, Rfl: 3     Review of Systems   Constitutional:  Negative for fever, chills, fatigue and malaise.   Skin:  Positive for activity-related sunscreen use. Negative for itching, rash and dry skin.     Objective:   Physical Exam   Constitutional: He appears well-developed and well-nourished.   Neurological: He is alert and oriented to person, place, and time.   Psychiatric: He has a normal mood and affect.   Skin:   Areas Examined (abnormalities noted in diagram):   Head / Face Inspection Performed          Diagram Legend     Erythematous scaling macule/papule c/w actinic keratosis       Vascular papule c/w angioma      Pigmented verrucoid papule/plaque c/w seborrheic keratosis      Yellow umbilicated papule c/w sebaceous hyperplasia      Irregularly shaped tan macule c/w lentigo     1-2 mm smooth white papules consistent with Milia      Movable subcutaneous cyst with punctum c/w epidermal inclusion cyst      Subcutaneous movable cyst c/w pilar cyst      Firm pink to brown papule c/w dermatofibroma      Pedunculated fleshy papule(s) c/w skin tag(s)      Evenly pigmented macule c/w junctional nevus     Mildly variegated pigmented, slightly irregular-bordered macule c/w mildly atypical nevus      Flesh colored to evenly pigmented papule c/w intradermal nevus       Pink pearly papule/plaque c/w basal cell carcinoma      Erythematous hyperkeratotic cursted plaque c/w SCC      Surgical scar  with no sign of skin cancer recurrence      Open and closed comedones      Inflammatory papules and pustules      Verrucoid papule consistent consistent with wart     Erythematous eczematous patches and plaques     Dystrophic onycholytic nail with subungual debris c/w onychomycosis     Umbilicated papule    Erythematous-base heme-crusted tan verrucoid plaque consistent with inflamed seborrheic keratosis     Erythematous Silvery Scaling Plaque c/w Psoriasis     See annotation        Assessment / Plan:      Pathology Orders:       Normal Orders This Visit    Specimen to Pathology, Dermatology     Questions:    Procedure Type: Dermatology and skin neoplasms    Number of Specimens: 1    ------------------------: -------------------------    Spec 1 Procedure: Biopsy    Spec 1 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 Area prepped with alcohol, anesthetized with approximately 1.0cc of 1% lidocaine with epinephrine. Lesional tissue shaved with razor blade. Hemostasis achieved with application of aluminum chloride followed by hyfrecation. No complications. Dressing applied. Wound care explained.           Follow up if symptoms worsen or fail to improve.

## 2023-05-01 LAB
FINAL PATHOLOGIC DIAGNOSIS: NORMAL
GROSS: NORMAL
Lab: NORMAL
MICROSCOPIC EXAM: NORMAL

## 2023-07-13 ENCOUNTER — OCCUPATIONAL HEALTH (OUTPATIENT)
Dept: URGENT CARE | Facility: CLINIC | Age: 28
End: 2023-07-13

## 2023-07-13 ENCOUNTER — CLINICAL SUPPORT (OUTPATIENT)
Dept: URGENT CARE | Facility: CLINIC | Age: 28
End: 2023-07-13

## 2023-07-13 DIAGNOSIS — Z02.83 ENCOUNTER FOR DRUG SCREENING: Primary | ICD-10-CM

## 2023-07-13 PROCEDURE — 80305 DRUG TEST PRSMV DIR OPT OBS: CPT | Mod: S$GLB,,, | Performed by: FAMILY MEDICINE

## 2023-07-13 PROCEDURE — 80305 OOH COLLECTION ONLY DRUG SCREEN: ICD-10-PCS | Mod: S$GLB,,, | Performed by: FAMILY MEDICINE
